# Patient Record
Sex: FEMALE | Race: BLACK OR AFRICAN AMERICAN | URBAN - METROPOLITAN AREA
[De-identification: names, ages, dates, MRNs, and addresses within clinical notes are randomized per-mention and may not be internally consistent; named-entity substitution may affect disease eponyms.]

---

## 2017-05-05 ENCOUNTER — APPOINTMENT (OUTPATIENT)
Dept: CT IMAGING | Facility: CLINIC | Age: 32
End: 2017-05-05
Attending: EMERGENCY MEDICINE
Payer: COMMERCIAL

## 2017-05-05 ENCOUNTER — HOSPITAL ENCOUNTER (EMERGENCY)
Facility: CLINIC | Age: 32
Discharge: HOME | End: 2017-05-05
Attending: EMERGENCY MEDICINE | Admitting: EMERGENCY MEDICINE
Payer: COMMERCIAL

## 2017-05-05 VITALS
WEIGHT: 210 LBS | RESPIRATION RATE: 20 BRPM | BODY MASS INDEX: 38.41 KG/M2 | SYSTOLIC BLOOD PRESSURE: 108 MMHG | OXYGEN SATURATION: 98 % | DIASTOLIC BLOOD PRESSURE: 70 MMHG | TEMPERATURE: 98.4 F

## 2017-05-05 DIAGNOSIS — R10.13 ABDOMINAL PAIN, EPIGASTRIC: ICD-10-CM

## 2017-05-05 LAB
ALBUMIN SERPL-MCNC: 3.4 G/DL (ref 3.4–5)
ALBUMIN UR-MCNC: 30 MG/DL
ALP SERPL-CCNC: 81 U/L (ref 40–150)
ALT SERPL W P-5'-P-CCNC: 14 U/L (ref 0–50)
ANION GAP SERPL CALCULATED.3IONS-SCNC: 6 MMOL/L (ref 3–14)
APPEARANCE UR: CLEAR
AST SERPL W P-5'-P-CCNC: 16 U/L (ref 0–45)
BASOPHILS # BLD AUTO: 0 10E9/L (ref 0–0.2)
BASOPHILS NFR BLD AUTO: 0.5 %
BILIRUB SERPL-MCNC: 0.6 MG/DL (ref 0.2–1.3)
BILIRUB UR QL STRIP: NEGATIVE
BUN SERPL-MCNC: 7 MG/DL (ref 7–30)
CALCIUM SERPL-MCNC: 8.8 MG/DL (ref 8.5–10.1)
CHLORIDE SERPL-SCNC: 112 MMOL/L (ref 94–109)
CO2 SERPL-SCNC: 23 MMOL/L (ref 20–32)
COLOR UR AUTO: YELLOW
CREAT SERPL-MCNC: 0.59 MG/DL (ref 0.52–1.04)
DIFFERENTIAL METHOD BLD: NORMAL
EOSINOPHIL # BLD AUTO: 0 10E9/L (ref 0–0.7)
EOSINOPHIL NFR BLD AUTO: 0.4 %
ERYTHROCYTE [DISTWIDTH] IN BLOOD BY AUTOMATED COUNT: 13.7 % (ref 10–15)
GFR SERPL CREATININE-BSD FRML MDRD: ABNORMAL ML/MIN/1.7M2
GLUCOSE SERPL-MCNC: 87 MG/DL (ref 70–99)
GLUCOSE UR STRIP-MCNC: NEGATIVE MG/DL
HCG UR QL: NEGATIVE
HCT VFR BLD AUTO: 36.6 % (ref 35–47)
HGB BLD-MCNC: 12.6 G/DL (ref 11.7–15.7)
HGB UR QL STRIP: NEGATIVE
IMM GRANULOCYTES # BLD: 0 10E9/L (ref 0–0.4)
IMM GRANULOCYTES NFR BLD: 0.5 %
KETONES UR STRIP-MCNC: 40 MG/DL
LEUKOCYTE ESTERASE UR QL STRIP: NEGATIVE
LIPASE SERPL-CCNC: 144 U/L (ref 73–393)
LYMPHOCYTES # BLD AUTO: 1.9 10E9/L (ref 0.8–5.3)
LYMPHOCYTES NFR BLD AUTO: 33.3 %
MCH RBC QN AUTO: 31.1 PG (ref 26.5–33)
MCHC RBC AUTO-ENTMCNC: 34.4 G/DL (ref 31.5–36.5)
MCV RBC AUTO: 90 FL (ref 78–100)
MONOCYTES # BLD AUTO: 0.4 10E9/L (ref 0–1.3)
MONOCYTES NFR BLD AUTO: 6.2 %
MUCOUS THREADS #/AREA URNS LPF: PRESENT /LPF
NEUTROPHILS # BLD AUTO: 3.3 10E9/L (ref 1.6–8.3)
NEUTROPHILS NFR BLD AUTO: 59.1 %
NITRATE UR QL: NEGATIVE
NRBC # BLD AUTO: 0 10*3/UL
NRBC BLD AUTO-RTO: 0 /100
PH UR STRIP: 8 PH (ref 5–7)
PLATELET # BLD AUTO: 221 10E9/L (ref 150–450)
POTASSIUM SERPL-SCNC: 4 MMOL/L (ref 3.4–5.3)
PROT SERPL-MCNC: 7.2 G/DL (ref 6.8–8.8)
RBC # BLD AUTO: 4.05 10E12/L (ref 3.8–5.2)
RBC #/AREA URNS AUTO: 3 /HPF (ref 0–2)
SODIUM SERPL-SCNC: 141 MMOL/L (ref 133–144)
SP GR UR STRIP: 1.03 (ref 1–1.03)
SQUAMOUS #/AREA URNS AUTO: 2 /HPF (ref 0–1)
TRI-PHOS CRY #/AREA URNS HPF: ABNORMAL /HPF
URN SPEC COLLECT METH UR: ABNORMAL
UROBILINOGEN UR STRIP-MCNC: 4 MG/DL (ref 0–2)
WBC # BLD AUTO: 5.7 10E9/L (ref 4–11)
WBC #/AREA URNS AUTO: 1 /HPF (ref 0–2)

## 2017-05-05 PROCEDURE — 25000125 ZZHC RX 250

## 2017-05-05 PROCEDURE — 25500064 ZZH RX 255 OP 636: Performed by: EMERGENCY MEDICINE

## 2017-05-05 PROCEDURE — 25000128 H RX IP 250 OP 636: Performed by: PHYSICIAN ASSISTANT

## 2017-05-05 PROCEDURE — 96374 THER/PROPH/DIAG INJ IV PUSH: CPT | Mod: 59

## 2017-05-05 PROCEDURE — 85025 COMPLETE CBC W/AUTO DIFF WBC: CPT | Performed by: PHYSICIAN ASSISTANT

## 2017-05-05 PROCEDURE — 96361 HYDRATE IV INFUSION ADD-ON: CPT

## 2017-05-05 PROCEDURE — 40000257 ZZH STATISTIC CONSULT NO CHARGE VASC ACCESS

## 2017-05-05 PROCEDURE — 80053 COMPREHEN METABOLIC PANEL: CPT | Performed by: PHYSICIAN ASSISTANT

## 2017-05-05 PROCEDURE — 25000125 ZZHC RX 250: Performed by: EMERGENCY MEDICINE

## 2017-05-05 PROCEDURE — 81001 URINALYSIS AUTO W/SCOPE: CPT | Performed by: PHYSICIAN ASSISTANT

## 2017-05-05 PROCEDURE — 74177 CT ABD & PELVIS W/CONTRAST: CPT

## 2017-05-05 PROCEDURE — 99285 EMERGENCY DEPT VISIT HI MDM: CPT | Mod: 25

## 2017-05-05 PROCEDURE — 81025 URINE PREGNANCY TEST: CPT | Performed by: PHYSICIAN ASSISTANT

## 2017-05-05 PROCEDURE — 83690 ASSAY OF LIPASE: CPT | Performed by: PHYSICIAN ASSISTANT

## 2017-05-05 PROCEDURE — 40000556 ZZH STATISTIC PERIPHERAL IV START W US GUIDANCE

## 2017-05-05 RX ORDER — IOPAMIDOL 755 MG/ML
105 INJECTION, SOLUTION INTRAVASCULAR ONCE
Status: COMPLETED | OUTPATIENT
Start: 2017-05-05 | End: 2017-05-05

## 2017-05-05 RX ORDER — ONDANSETRON 4 MG/1
TABLET, ORALLY DISINTEGRATING ORAL
Status: COMPLETED
Start: 2017-05-05 | End: 2017-05-05

## 2017-05-05 RX ORDER — ONDANSETRON 4 MG/1
TABLET, ORALLY DISINTEGRATING ORAL
Status: DISCONTINUED
Start: 2017-05-05 | End: 2017-05-05 | Stop reason: HOSPADM

## 2017-05-05 RX ORDER — HYDROMORPHONE HYDROCHLORIDE 1 MG/ML
0.5 INJECTION, SOLUTION INTRAMUSCULAR; INTRAVENOUS; SUBCUTANEOUS ONCE
Status: DISCONTINUED | OUTPATIENT
Start: 2017-05-05 | End: 2017-05-05 | Stop reason: CLARIF

## 2017-05-05 RX ORDER — ONDANSETRON 4 MG/1
4 TABLET, ORALLY DISINTEGRATING ORAL ONCE
Status: DISCONTINUED | OUTPATIENT
Start: 2017-05-05 | End: 2017-05-05

## 2017-05-05 RX ORDER — ONDANSETRON 2 MG/ML
4 INJECTION INTRAMUSCULAR; INTRAVENOUS EVERY 30 MIN PRN
Status: DISCONTINUED | OUTPATIENT
Start: 2017-05-05 | End: 2017-05-05 | Stop reason: HOSPADM

## 2017-05-05 RX ORDER — SUCRALFATE ORAL 1 G/10ML
1 SUSPENSION ORAL 4 TIMES DAILY
Qty: 420 ML | Refills: 1 | Status: SHIPPED | OUTPATIENT
Start: 2017-05-05 | End: 2018-04-02

## 2017-05-05 RX ORDER — HYDROMORPHONE HYDROCHLORIDE 1 MG/ML
0.5 INJECTION, SOLUTION INTRAMUSCULAR; INTRAVENOUS; SUBCUTANEOUS
Status: COMPLETED | OUTPATIENT
Start: 2017-05-05 | End: 2017-05-05

## 2017-05-05 RX ORDER — ONDANSETRON 4 MG/1
4 TABLET, FILM COATED ORAL EVERY 8 HOURS PRN
Qty: 18 TABLET | Refills: 0 | Status: SHIPPED | OUTPATIENT
Start: 2017-05-05

## 2017-05-05 RX ADMIN — ONDANSETRON 4 MG: 4 TABLET, ORALLY DISINTEGRATING ORAL at 13:49

## 2017-05-05 RX ADMIN — SODIUM CHLORIDE 1000 ML: 9 INJECTION, SOLUTION INTRAVENOUS at 15:09

## 2017-05-05 RX ADMIN — IOPAMIDOL 105 ML: 755 INJECTION, SOLUTION INTRAVENOUS at 17:36

## 2017-05-05 RX ADMIN — HYDROMORPHONE HYDROCHLORIDE 0.5 MG: 1 INJECTION, SOLUTION INTRAMUSCULAR; INTRAVENOUS; SUBCUTANEOUS at 15:07

## 2017-05-05 RX ADMIN — SODIUM CHLORIDE 72 ML: 9 INJECTION, SOLUTION INTRAVENOUS at 17:42

## 2017-05-05 ASSESSMENT — ENCOUNTER SYMPTOMS
PALPITATIONS: 0
NAUSEA: 1
CHEST TIGHTNESS: 0
ABDOMINAL DISTENTION: 0
SHORTNESS OF BREATH: 0
VOMITING: 1
CHILLS: 0
DIARRHEA: 1
HEMATURIA: 0
FLANK PAIN: 0
FEVER: 0
ABDOMINAL PAIN: 1
DIFFICULTY URINATING: 0

## 2017-05-05 NOTE — ED PROVIDER NOTES
History     Chief Complaint:    Abdominal Pain (with vomiting for past few days )      HPI   Alexandru Trujillo is a 31 year old female who presents with intermittent sharp abdominal pain and vomiting for several weeks. The pain is in the epigastrium and occurs right after eating greasy foods. It is better when she is laying down. It has been getting increasingly more severe with each episode. She has been taking Zofran and home for nausea and it seems to help some. She has also been taking kayexalate for constipation. She has been having BMs every day, but they have been runny. Her last normal bowel movement was several weeks ago. She does use Ibuprofen a lot. She has had extensive abdominal surgeries including cholecystectomy, hysterectomy, and  x3. She also reports darker than usual stools, but denies gross blood. She denies fever, chest pain, shortness or breath.     Allergies:    No Known Allergies     Medications:      ferrous sulfate (SLO-FE) 142 (45 FE) MG TBCR   oxyCODONE-acetaminophen (PERCOCET)  MG per tablet   senna-docusate (SENOKOT-S;PERICOLACE) 8.6-50 MG per tablet   ondansetron (ZOFRAN ODT) 4 MG disintegrating tablet       Problem List:    Patient Active Problem List    Diagnosis Date Noted     Postoperative pain 10/26/2016     Priority: Medium     Pain 10/25/2016     Priority: Medium     Dysmenorrhea 08/15/2016     Priority: Medium     Excessive or frequent menstruation 08/15/2016     Priority: Medium     Symptomatic cholelithiasis 2016     Priority: Medium        Past Medical History:      Past Medical History:   Diagnosis Date     NO ACTIVE PROBLEMS        Past Surgical History:      Past Surgical History:   Procedure Laterality Date     CYSTOSCOPY N/A 10/26/2016    Procedure: CYSTOSCOPY;  Surgeon: Mahendra Steen MD;  Location:  OR     LAPAROSCOPIC CHOLECYSTECTOMY N/A 2016    Procedure: LAPAROSCOPIC CHOLECYSTECTOMY;  Surgeon: Boaz Pinto MD;  Location:  [facility] OR     LAPAROSCOPIC HYSTERECTOMY SUPRACERVICAL N/A 10/26/2016    Procedure: LAPAROSCOPIC HYSTERECTOMY SUPRACERVICAL;  Surgeon: Mahendra Steen MD;  Location: [facility] OR     LAPAROSCOPIC SALPINGECTOMY Bilateral 10/26/2016    Procedure: LAPAROSCOPIC SALPINGECTOMY;  Surgeon: Mahendra Steen MD;  Location: [facility] OR     TUBAL LIGATION  2009       Family History:    Family History   Problem Relation Age of Onset     DIABETES No family hx of      Myocardial Infarction No family hx of      CEREBROVASCULAR DISEASE No family hx of      Breast Cancer Maternal Grandmother 50     CANCER No family hx of      no colon or ovarian CA     Asthma Brother      Asthma Son        Social History:  Marital Status:   [2]  Social History   Substance Use Topics     Smoking status: Never Smoker     Smokeless tobacco: Never Used     Alcohol use No        Review of Systems   Constitutional: Negative for chills and fever.   Respiratory: Negative for chest tightness and shortness of breath.    Cardiovascular: Negative for chest pain and palpitations.   Gastrointestinal: Positive for abdominal pain, diarrhea, nausea and vomiting. Negative for abdominal distention.   Genitourinary: Negative for difficulty urinating, flank pain, hematuria and urgency.   Skin: Negative for rash.       Physical Exam   First Vitals:  BP: 144/80  Heart Rate: 77  Temp: 98.4  F (36.9  C)  Resp: 18  Weight: 95.3 kg (210 lb)  SpO2: 93 %      Physical Exam  General: Distressed. Intermittently dry heaving and vomiting. Tearful.  Alert and oriented.   Head:  The scalp, face, and head appear normal   Eyes:  The pupils are equal, round, and reactive to light     Extraocular muscles are intact    Conjunctivae and sclerae are normal    CV:  Regular rate and rhythm     Normal S1/S2    No pathological murmur detected   Resp:  Lungs are clear to auscultation    Non-labored    No rales or wheezing   GI:  Abdomen is soft, non-distended    Tenderness in the epigastrium and  RUQ without rebound.     Hypoactive BS   MS:  Normal muscular tone   Skin:  No rash or acute skin lesions noted. Diaphoretic.   Neuro: Speech is normal and fluent.       Emergency Department Course   Imaging:  CT Abdomen Pelvis w Contrast   Preliminary Result   IMPRESSION:    1. A trace amount of nonspecific free fluid in the pelvis.   2. No other cause of acute pain identified in the abdomen or pelvis.   The appendix is unremarkable.        Laboratory:  Labs Ordered and Resulted from Time of ED Arrival Up to the Time of Departure from the ED   COMPREHENSIVE METABOLIC PANEL - Abnormal; Notable for the following:        Result Value    Chloride 112 (*)     All other components within normal limits   ROUTINE UA WITH MICROSCOPIC - Abnormal; Notable for the following:     Ketones Urine 40 (*)     pH Urine 8.0 (*)     Protein Albumin Urine 30 (*)     Urobilinogen mg/dL 4.0 (*)     RBC Urine 3 (*)     Squamous Epithelial /HPF Urine 2 (*)     Mucous Urine Present (*)     Triple Phosphates Moderate (*)     All other components within normal limits   CBC WITH PLATELETS DIFFERENTIAL   LIPASE   HCG QUALITATIVE URINE           Interventions:  Medications   ondansetron (ZOFRAN) injection 4 mg (not administered)   0.9% sodium chloride BOLUS (1,000 mLs Intravenous New Bag 5/5/17 1509)   HYDROmorphone (PF) (DILAUDID) injection 0.5 mg (0.5 mg Intravenous Given 5/5/17 1507)   ondansetron (ZOFRAN-ODT) 4 MG ODT tab (4 mg  Given 5/5/17 1349)   iopamidol (ISOVUE-370) solution 105 mL (105 mLs Intravenous Given 5/5/17 1736)   sodium chloride 0.9 % for CT scan flush dose 72 mL (72 mLs Intravenous Given 5/5/17 1742)     ED Course:  I reviewed the patient's medical record.   The patient was seen and examined by myself. I discussed the course of care with the patient including laboratory and diagnostic studies.    She understands and is agreeable to the plan.   Recheck. 6:15pm   I discussed with the patient the results of the above studies and  procedures.   She will be discharged to home with outpatient EGD and sent with prescriptions for a PPI and carafate.    All questions were answered prior to discharge, and the patient was told to follow up per discharge instructions.    Reasons for return as well as follow up were reviewed with the patient. She understands and agrees to this plan.      Impression & Plan    Medical Decision Making:  Alexandru Trujillo is a 31 year old female who presents with abdominal pain and vomiting for several weeks. There was a concern for bowel obstruction given given her symptoms and extensive abdominal surgeries, but CT was unremarkable. Lipase normal and hemoglobin stable.  All other lab work was unremarkable. The patient likely has peptic ulcer disease and was given prescriptions for omeprazole, Carafate, and Zofran and was set up for an outpatient EGD with [facility]. Avoiding Ibuprofen was also stressed.     Diagnosis:    ICD-10-CM    1. Abdominal pain, epigastric R10.13 UPPER GI ENDOSCOPY     GASTROENTEROLOGY ADULT REF PROCEDURE ONLY       Disposition:  discharged to home    Discharge Medications:  New Prescriptions    No medications on file         Vilma Sharma  5/5/2017    EMERGENCY DEPARTMENT       Vilma Sharma PA-C  05/05/17 0922

## 2017-05-05 NOTE — LETTER
EMERGENCY DEPARTMENT  [address] [facility] [address]  [phone]    May 5, 2017    Alexandru Trujillo  [address]  [facility] [address]  [phone] (home)     : 1985      To Whom it may concern:    Alexandru Trujillo was seen in our Emergency Department today, May 5, 2017.  I expect her condition to improve over the next 2 days.  She may return to work/school when improved.    Sincerely,    Vilma Sharma PA-C

## 2017-05-05 NOTE — ED AVS SNAPSHOT
Emergency Department    [address] [place] [address]    Phone:  [phone]    Fax:  [phone]                                       Alexnadru Trujillo   MRN: [id number]    Department:   Emergency Department   Date of Visit:  5/5/2017           After Visit Summary Signature Page     I have received my discharge instructions, and my questions have been answered. I have discussed any challenges I see with this plan with the nurse or doctor.    ..........................................................................................................................................  Patient/Patient Representative Signature      ..........................................................................................................................................  Patient Representative Print Name and Relationship to Patient    ..................................................               ................................................  Date                                            Time    ..........................................................................................................................................  Reviewed by Signature/Title    ...................................................              ..............................................  Date                                                            Time

## 2017-05-05 NOTE — ED NOTES
"Attempted IV start and blood draw on right upper arm- pt moved arm during IV placement and yelled out \"ouch\" \"my arm is numb\". IV placement and blood draw unsuccessful. Pt offered ODT Zofran at this time.   "

## 2017-05-05 NOTE — ED AVS SNAPSHOT
Emergency Department    [address] [facility] [address]    Phone:  [phone]    Fax:  [phone]                                       Alexandru Trujillo   MRN: [id number]    Department:   Emergency Department   Date of Visit:  5/5/2017           Patient Information     Date Of Birth          1985        Your diagnoses for this visit were:     Abdominal pain, epigastric        You were seen by eJssica Paiz MD.      Follow-up Information     Follow up with Clinic, [facility].    Contact information:    [address] [address]  [facility] [address] [phone]          Discharge Instructions       Please avoid Ibuprofen and other NSAID. Tylenol is a better choice for pain. The EDG referral service will call you in the next couple of days to set up an appointment. Return to the ED if blood in the stool or worsening symptoms.     Future Appointments        Provider Department Dept Phone Center    5/11/2017 8:30 AM Eveline Olivares MD [facility] [phone]       24 Hour Appointment Hotline       To make an appointment at any [facility], call [phone] ([phone]). If you don't have a family doctor or clinic, we will help you find one. [facility] are conveniently located to serve the needs of you and your family.          ED Discharge Orders     GASTROENTEROLOGY ADULT REF PROCEDURE ONLY       Last Lab Result: Creatinine (mg/dL)       Date                     Value                 05/05/2017               0.59             ----------  Body mass index is 38.41 kg/(m^2).     Needed:  No  Language:  English    Patient will be contacted to schedule procedure.     Please be aware that coverage of these services is subject to the terms and limitations of your health insurance plan.  Call member services at your health plan with any benefit or coverage questions.  Any procedures must be performed at a [facility] facility OR  coordinated by your clinic's referral office.    Please bring the following with you to your appointment:    (1) Any X-Rays, CTs or MRIs which have been performed.  Contact the facility where they were done to arrange for  prior to your scheduled appointment.    (2) List of current medications   (3) This referral request   (4) Any documents/labs given to you for this referral            UPPER GI ENDOSCOPY                    Review of your medicines      START taking        Dose / Directions Last dose taken    omeprazole 20 MG CR capsule   Commonly known as:  priLOSEC   Dose:  40 mg   Quantity:  120 capsule        Take 2 capsules (40 mg) by mouth 2 times daily   Refills:  0        ondansetron 4 MG tablet   Commonly known as:  ZOFRAN   Dose:  4 mg   Quantity:  18 tablet        Take 1 tablet (4 mg) by mouth every 8 hours as needed for nausea   Refills:  0        sucralfate 1 GM/10ML suspension   Commonly known as:  CARAFATE   Dose:  1 g   Quantity:  420 mL        Take 10 mLs (1 g) by mouth 4 times daily   Refills:  1          Our records show that you are taking the medicines listed below. If these are incorrect, please call your family doctor or clinic.        Dose / Directions Last dose taken    ferrous sulfate 142 (45 FE) MG Tbcr   Commonly known as:  SLO-FE   Dose:  142 mg   Quantity:  90 tablet        Take 1 tablet (142 mg) by mouth daily   Refills:  3        ondansetron 4 MG ODT tab   Commonly known as:  ZOFRAN ODT   Dose:  4-8 mg   Quantity:  30 tablet        Take 1-2 tablets (4-8 mg) by mouth every 8 hours as needed for nausea   Refills:  1        oxyCODONE-acetaminophen  MG per tablet   Commonly known as:  PERCOCET   Dose:  1-2 tablet   Quantity:  30 tablet        Take 1-2 tablets by mouth every 4 hours as needed for pain (moderate to severe)   Refills:  0        senna-docusate 8.6-50 MG per tablet   Commonly known as:  SENOKOT-S;PERICOLACE   Dose:  2 tablet   Quantity:  60 tablet        Take 2  tablets by mouth daily   Refills:  1                Prescriptions were sent or printed at these locations (3 Prescriptions)                   Other Prescriptions                Printed at Department/Unit printer (3 of 3)         omeprazole (PRILOSEC) 20 MG CR capsule               sucralfate (CARAFATE) 1 GM/10ML suspension               ondansetron (ZOFRAN) 4 MG tablet                Procedures and tests performed during your visit     CBC with platelets differential    CT Abdomen Pelvis w Contrast    Comprehensive metabolic panel    HCG qualitative urine    Lipase    UA with Microscopic      Orders Needing Specimen Collection     None      Pending Results     Date and Time Order Name Status Description    5/5/2017 1552 CT Abdomen Pelvis w Contrast Preliminary             Pending Culture Results     No orders found from 5/3/2017 to 5/6/2017.            Pending Results Instructions     If you had any lab results that were not finalized at the time of your Discharge, you can call the ED Lab Result RN at [phone]. You will be contacted by this team for any positive Lab results or changes in treatment. The nurses are available 7 days a week from 10A to 6:30P.  You can leave a message 24 hours per day and they will return your call.        Test Results From Your Hospital Stay        5/5/2017  3:22 PM      Component Results     Component Value Ref Range & Units Status    WBC 5.7 4.0 - 11.0 10e9/L Final    RBC Count 4.05 3.8 - 5.2 10e12/L Final    Hemoglobin 12.6 11.7 - 15.7 g/dL Final    Hematocrit 36.6 35.0 - 47.0 % Final    MCV 90 78 - 100 fl Final    MCH 31.1 26.5 - 33.0 pg Final    MCHC 34.4 31.5 - 36.5 g/dL Final    RDW 13.7 10.0 - 15.0 % Final    Platelet Count 221 150 - 450 10e9/L Final    Diff Method Automated Method  Final    % Neutrophils 59.1 % Final    % Lymphocytes 33.3 % Final    % Monocytes 6.2 % Final    % Eosinophils 0.4 % Final    % Basophils 0.5 % Final    % Immature Granulocytes 0.5 % Final     Nucleated RBCs 0 0 /100 Final    Absolute Neutrophil 3.3 1.6 - 8.3 10e9/L Final    Absolute Lymphocytes 1.9 0.8 - 5.3 10e9/L Final    Absolute Monocytes 0.4 0.0 - 1.3 10e9/L Final    Absolute Eosinophils 0.0 0.0 - 0.7 10e9/L Final    Absolute Basophils 0.0 0.0 - 0.2 10e9/L Final    Abs Immature Granulocytes 0.0 0 - 0.4 10e9/L Final    Absolute Nucleated RBC 0.0  Final         5/5/2017  3:45 PM      Component Results     Component Value Ref Range & Units Status    Sodium 141 133 - 144 mmol/L Final    Potassium 4.0 3.4 - 5.3 mmol/L Final    Chloride 112 (H) 94 - 109 mmol/L Final    Carbon Dioxide 23 20 - 32 mmol/L Final    Anion Gap 6 3 - 14 mmol/L Final    Glucose 87 70 - 99 mg/dL Final    Urea Nitrogen 7 7 - 30 mg/dL Final    Creatinine 0.59 0.52 - 1.04 mg/dL Final    GFR Estimate >90  Non  GFR Calc   >60 mL/min/1.7m2 Final    GFR Estimate If Black >90   GFR Calc   >60 mL/min/1.7m2 Final    Calcium 8.8 8.5 - 10.1 mg/dL Final    Bilirubin Total 0.6 0.2 - 1.3 mg/dL Final    Albumin 3.4 3.4 - 5.0 g/dL Final    Protein Total 7.2 6.8 - 8.8 g/dL Final    Alkaline Phosphatase 81 40 - 150 U/L Final    ALT 14 0 - 50 U/L Final    AST 16 0 - 45 U/L Final         5/5/2017  3:44 PM      Component Results     Component Value Ref Range & Units Status    Lipase 144 73 - 393 U/L Final         5/5/2017  4:25 PM      Component Results     Component Value Ref Range & Units Status    HCG Qual Urine Negative NEG Final         5/5/2017  4:39 PM      Component Results     Component Value Ref Range & Units Status    Color Urine Yellow  Final    Appearance Urine Clear  Final    Glucose Urine Negative NEG mg/dL Final    Bilirubin Urine Negative NEG Final    Ketones Urine 40 (A) NEG mg/dL Final    Specific Gravity Urine 1.026 1.003 - 1.035 Final    Blood Urine Negative NEG Final    pH Urine 8.0 (H) 5.0 - 7.0 pH Final    Protein Albumin Urine 30 (A) NEG mg/dL Final    Urobilinogen mg/dL 4.0 (H) 0.0 - 2.0 mg/dL  Final    Nitrite Urine Negative NEG Final    Leukocyte Esterase Urine Negative NEG Final    Source Midstream Urine  Final    WBC Urine 1 0 - 2 /HPF Final    RBC Urine 3 (H) 0 - 2 /HPF Final    Squamous Epithelial /HPF Urine 2 (H) 0 - 1 /HPF Final    Mucous Urine Present (A) NEG /LPF Final    Triple Phosphates Moderate (A) NEG /HPF Final         5/5/2017  6:06 PM      Narrative     CT ABDOMEN AND PELVIS WITH CONTRAST   5/5/2017 5:44 PM     HISTORY: Abdominal pain and vomiting.    COMPARISON: 10/24/2016.    TECHNIQUE: Following the uneventful administration of 105 mL  Isovue-370 intravenous contrast, helical sections were acquired from  the top of the diaphragm through the pubic symphysis. Coronal  reconstructions were generated. Radiation dose for this scan was  reduced using automated exposure control, adjustment of the mA and/or  kV according to the patient's size, or iterative reconstruction  technique.    FINDINGS:     Abdomen: The liver, spleen, pancreas, adrenal glands and kidneys are  unremarkable. Prior cholecystectomy. No enlarged lymph nodes or free  fluid in the upper abdomen.    Scan through the lower chest is unremarkable.    Pelvis: The small and large bowel are normal in caliber. The appendix  is unremarkable. No bowel wall thickening, pneumatosis or free  intraperitoneal gas. No enlarged lymph nodes in the pelvis. A trace  amount of free fluid in the pelvis.        Impression     IMPRESSION:   1. A trace amount of nonspecific free fluid in the pelvis.  2. No other cause of acute pain identified in the abdomen or pelvis.  The appendix is unremarkable.                Clinical Quality Measure: Blood Pressure Screening     Your blood pressure was checked while you were in the emergency department today. The last reading we obtained was  BP: 122/78 . Please read the guidelines below about what these numbers mean and what you should do about them.  If your systolic blood pressure (the top number) is less  "than 120 and your diastolic blood pressure (the bottom number) is less than 80, then your blood pressure is normal. There is nothing more that you need to do about it.  If your systolic blood pressure (the top number) is 120-139 or your diastolic blood pressure (the bottom number) is 80-89, your blood pressure may be higher than it should be. You should have your blood pressure rechecked within a year by a primary care provider.  If your systolic blood pressure (the top number) is 140 or greater or your diastolic blood pressure (the bottom number) is 90 or greater, you may have high blood pressure. High blood pressure is treatable, but if left untreated over time it can put you at risk for heart attack, stroke, or kidney failure. You should have your blood pressure rechecked by a primary care provider within the next 4 weeks.  If your provider in the emergency department today gave you specific instructions to follow-up with your doctor or provider even sooner than that, you should follow that instruction and not wait for up to 4 weeks for your follow-up visit.        Thank you for choosing [facility]       Thank you for choosing [facility] for your care. Our goal is always to provide you with excellent care. Hearing back from our patients is one way we can continue to improve our services. Please take a few minutes to complete the written survey that you may receive in the mail after you visit with us. Thank you!        [employer] Information     [employer] lets you send messages to your doctor, view your test results, renew your prescriptions, schedule appointments and more. To sign up, go to [email or web address] . Click on \"Log in\" on the left side of the screen, which will take you to the Welcome page. Then click on \"Sign up Now\" on the right side of the page.     You will be asked to enter the access code listed below, as well as some personal information. Please follow the directions to create your username and " password.     Your access code is: [email or web address]  Expires: 8/3/2017  6:35 PM     Your access code will  in 90 days. If you need help or a new code, please call your [facility] or [phone].        Care EveryWhere ID     This is your Care EveryWhere ID. This could be used by other organizations to access your [facility] medical records  [id number]        After Visit Summary       This is your record. Keep this with you and show to your community pharmacist(s) and doctor(s) at your next visit.

## 2017-07-15 ENCOUNTER — HEALTH MAINTENANCE LETTER (OUTPATIENT)
Age: 32
End: 2017-07-15

## 2017-08-07 ENCOUNTER — HOSPITAL ENCOUNTER (EMERGENCY)
Facility: CLINIC | Age: 32
Discharge: HOME | End: 2017-08-07
Attending: EMERGENCY MEDICINE | Admitting: EMERGENCY MEDICINE
Payer: COMMERCIAL

## 2017-08-07 VITALS
DIASTOLIC BLOOD PRESSURE: 79 MMHG | BODY MASS INDEX: 38.41 KG/M2 | SYSTOLIC BLOOD PRESSURE: 116 MMHG | RESPIRATION RATE: 20 BRPM | TEMPERATURE: 98.3 F | OXYGEN SATURATION: 99 % | WEIGHT: 210 LBS

## 2017-08-07 DIAGNOSIS — M54.50 ACUTE LOW BACK PAIN WITHOUT SCIATICA, UNSPECIFIED BACK PAIN LATERALITY: ICD-10-CM

## 2017-08-07 PROCEDURE — 99284 EMERGENCY DEPT VISIT MOD MDM: CPT | Mod: 25

## 2017-08-07 PROCEDURE — 96372 THER/PROPH/DIAG INJ SC/IM: CPT

## 2017-08-07 PROCEDURE — 25000128 H RX IP 250 OP 636: Performed by: EMERGENCY MEDICINE

## 2017-08-07 PROCEDURE — 25000132 ZZH RX MED GY IP 250 OP 250 PS 637: Performed by: EMERGENCY MEDICINE

## 2017-08-07 RX ORDER — TRAMADOL HYDROCHLORIDE 50 MG/1
50-100 TABLET ORAL EVERY 6 HOURS PRN
Qty: 20 TABLET | Refills: 0 | Status: SHIPPED | OUTPATIENT
Start: 2017-08-07 | End: 2018-04-02

## 2017-08-07 RX ORDER — TRAMADOL HYDROCHLORIDE 50 MG/1
100 TABLET ORAL ONCE
Status: COMPLETED | OUTPATIENT
Start: 2017-08-07 | End: 2017-08-07

## 2017-08-07 RX ORDER — CYCLOBENZAPRINE HCL 10 MG
10 TABLET ORAL ONCE
Status: COMPLETED | OUTPATIENT
Start: 2017-08-07 | End: 2017-08-07

## 2017-08-07 RX ORDER — LIDOCAINE 50 MG/G
1 PATCH TOPICAL EVERY 24 HOURS
Qty: 10 PATCH | Refills: 0 | Status: SHIPPED | OUTPATIENT
Start: 2017-08-07 | End: 2017-08-17

## 2017-08-07 RX ORDER — LIDOCAINE 50 MG/G
1 PATCH TOPICAL ONCE
Status: COMPLETED | OUTPATIENT
Start: 2017-08-07 | End: 2017-08-07

## 2017-08-07 RX ORDER — KETOROLAC TROMETHAMINE 30 MG/ML
60 INJECTION, SOLUTION INTRAMUSCULAR; INTRAVENOUS ONCE
Status: COMPLETED | OUTPATIENT
Start: 2017-08-07 | End: 2017-08-07

## 2017-08-07 RX ORDER — CYCLOBENZAPRINE HCL 10 MG
10 TABLET ORAL 3 TIMES DAILY PRN
Qty: 30 TABLET | Refills: 0 | Status: SHIPPED | OUTPATIENT
Start: 2017-08-07 | End: 2018-04-30

## 2017-08-07 RX ADMIN — CYCLOBENZAPRINE HYDROCHLORIDE 10 MG: 10 TABLET, FILM COATED ORAL at 07:18

## 2017-08-07 RX ADMIN — LIDOCAINE 1 PATCH: 50 PATCH CUTANEOUS at 07:22

## 2017-08-07 RX ADMIN — TRAMADOL HYDROCHLORIDE 100 MG: 50 TABLET, COATED ORAL at 07:18

## 2017-08-07 RX ADMIN — KETOROLAC TROMETHAMINE 60 MG: 30 INJECTION, SOLUTION INTRAMUSCULAR at 07:20

## 2017-08-07 ASSESSMENT — ENCOUNTER SYMPTOMS
NUMBNESS: 0
BACK PAIN: 1
ABDOMINAL PAIN: 0
FEVER: 0
CHILLS: 0

## 2017-08-07 NOTE — ED AVS SNAPSHOT
[facility] Emergency Department    [address]    [facility] [address]    Phone:  [phone]    Fax:  [phone]                                       Alexandru Trujillo   MRN: [id number]    Department:  [facility] Emergency Department   Date of Visit:  8/7/2017           Patient Information     Date Of Birth          1985        Your diagnoses for this visit were:     Acute low back pain without sciatica, unspecified back pain laterality        You were seen by Candy Nieto MD.      Follow-up Information     Follow up with Eveline Olivares MD.    Specialty:  Internal Medicine    Why:  5-7 days as needed    Contact information:    [facility]  [address] [facility] [address]  [phone]          Follow up with [facility] Emergency [facility].    Specialty:  EMERGENCY MEDICINE    Why:  As needed, If symptoms worsen    Contact information:    [address]  [facility] [address] [phone]        Discharge Instructions       Discharge Instructions  Back Pain  You were seen today for back pain. Back pain can have many causes, but most will get better without surgery or other specific treatment. Sometimes there is a herniated ( slipped ) disc. We do not usually do MRI scans to look for these right away, since most herniated discs will get better on their own with time.  Today, we did not find any evidence that your back pain was caused by a serious condition. However, sometimes symptoms develop over time and cannot be found during an emergency visit, so it is very important that you follow up with your primary provider.  Generally, every Emergency Department visit should have a follow-up clinic visit with either a primary or a specialty clinic/provider. Please follow-up as instructed by your emergency provider today.    Return to the Emergency Department if:    You develop a fever with your back pain.      You have weakness or change in sensation in one or both legs.    You lose control of your bowels or bladder, or cannot empty your bladder (cannot pee).    Your pain gets much worse.     Follow-up with your provider:    Unless your pain has completely gone away, please make an appointment with your provider within one week. Most of the routine care for back pain is available in a clinic and not the Emergency Department. You may need further management of your back pain, such as more pain medication, imaging such as an X-ray or MRI, or physical therapy.    What can I do to help myself?    Remain Active -- People are often afraid that they will hurt their back further or delay recovery by remaining active, but this is one of the best things you can do for your back. In fact, staying in bed for a long time to rest is not recommended. Studies have shown that people with low back pain recover faster when they remain active. Movement helps to bring blood flow to the muscles and relieve muscle spasms as well as preventing loss of muscle strength.    Heat -- Using a heating pad can help with low back pain during the first few weeks. Do not sleep with a heating pad, as you can be burned.     Pain medications - You may take a pain medication such as Tylenol  (acetaminophen), Advil , Motrin  (ibuprofen) or Aleve  (naproxen).  If you were given a prescription for medicine here today, be sure to read all of the information (including the package insert) that comes with your prescription.  This will include important information about the medicine, its side effects, and any warnings that you need to know about.  The pharmacist who fills the prescription can provide more information and answer questions you may have about the medicine.  If you have questions or concerns that the pharmacist cannot address, please call or return to the Emergency Department.   Remember that you can always come back to the Emergency Department if  you are not able to see your regular provider in the amount of time listed above, if you get any new symptoms, or if there is anything that worries you.      24 Hour Appointment Hotline       To make an appointment at any [facility] clinic, call [phone] ([phone]). If you don't have a family doctor or clinic, we will help you find one. [facility] clinics are conveniently located to serve the needs of you and your family.             Review of your medicines      START taking        Dose / Directions Last dose taken    cyclobenzaprine 10 MG tablet   Commonly known as:  FLEXERIL   Dose:  10 mg   Quantity:  30 tablet        Take 1 tablet (10 mg) by mouth 3 times daily as needed for muscle spasms   Refills:  0        lidocaine 5 % Patch   Commonly known as:  LIDODERM   Dose:  1 patch   Quantity:  10 patch        Place 1 patch onto the skin every 24 hours for 10 days   Refills:  0        traMADol 50 MG tablet   Commonly known as:  ULTRAM   Dose:   mg   Quantity:  20 tablet        Take 1-2 tablets ( mg) by mouth every 6 hours as needed for pain   Refills:  0          Our records show that you are taking the medicines listed below. If these are incorrect, please call your family doctor or clinic.        Dose / Directions Last dose taken    ferrous sulfate 142 (45 FE) MG Tbcr   Commonly known as:  SLO-FE   Dose:  142 mg   Quantity:  90 tablet        Take 1 tablet (142 mg) by mouth daily   Refills:  3        ondansetron 4 MG tablet   Commonly known as:  ZOFRAN   Dose:  4 mg   Quantity:  18 tablet        Take 1 tablet (4 mg) by mouth every 8 hours as needed for nausea   Refills:  0        senna-docusate 8.6-50 MG per tablet   Commonly known as:  SENOKOT-S;PERICOLACE   Dose:  2 tablet   Quantity:  60 tablet        Take 2 tablets by mouth daily   Refills:  1        sucralfate 1 GM/10ML suspension   Commonly known as:  CARAFATE   Dose:  1 g   Quantity:  420 mL        Take 10 mLs (1 g) by mouth 4 times daily    Refills:  1                Prescriptions were sent or printed at these locations (3 Prescriptions)                   Other Prescriptions                Printed at Department/Unit printer (3 of 3)         traMADol (ULTRAM) 50 MG tablet               cyclobenzaprine (FLEXERIL) 10 MG tablet               lidocaine (LIDODERM) 5 % Patch                Orders Needing Specimen Collection     None      Pending Results     No orders found from 8/5/2017 to 8/8/2017.            Pending Culture Results     No orders found from 8/5/2017 to 8/8/2017.            Pending Results Instructions     If you had any lab results that were not finalized at the time of your Discharge, you can call the ED Lab Result RN at [phone]. You will be contacted by this team for any positive Lab results or changes in treatment. The nurses are available 7 days a week from 10A to 6:30P.  You can leave a message 24 hours per day and they will return your call.        Test Results From Your Hospital Stay               Clinical Quality Measure: Blood Pressure Screening     Your blood pressure was checked while you were in the emergency department today. The last reading we obtained was  BP: 131/88 . Please read the guidelines below about what these numbers mean and what you should do about them.  If your systolic blood pressure (the top number) is less than 120 and your diastolic blood pressure (the bottom number) is less than 80, then your blood pressure is normal. There is nothing more that you need to do about it.  If your systolic blood pressure (the top number) is 120-139 or your diastolic blood pressure (the bottom number) is 80-89, your blood pressure may be higher than it should be. You should have your blood pressure rechecked within a year by a primary care provider.  If your systolic blood pressure (the top number) is 140 or greater or your diastolic blood pressure (the bottom number) is 90 or greater, you may have high blood pressure.  "High blood pressure is treatable, but if left untreated over time it can put you at risk for heart attack, stroke, or kidney failure. You should have your blood pressure rechecked by a primary care provider within the next 4 weeks.  If your provider in the emergency department today gave you specific instructions to follow-up with your doctor or provider even sooner than that, you should follow that instruction and not wait for up to 4 weeks for your follow-up visit.        Thank you for choosing [facility]       Thank you for choosing [facility] for your care. Our goal is always to provide you with excellent care. Hearing back from our patients is one way we can continue to improve our services. Please take a few minutes to complete the written survey that you may receive in the mail after you visit with us. Thank you!        [facility] Information     [facility] lets you send messages to your doctor, view your test results, renew your prescriptions, schedule appointments and more. To sign up, go to [email or web address] . Click on \"Log in\" on the left side of the screen, which will take you to the Welcome page. Then click on \"Sign up Now\" on the right side of the page.     You will be asked to enter the access code listed below, as well as some personal information. Please follow the directions to create your username and password.     Your access code is: [email or web address]  Expires: 2017  7:54 AM     Your access code will  in 90 days. If you need help or a new code, please call your [facility] clinic or [phone].        Care EveryWhere ID     This is your Care EveryWhere ID. This could be used by other organizations to access your [facility] medical records  [id number]        Equal Access to Services     LEE [place] : Hadroyce Lowe, sampson reilly, antoinette pete. So [facility] [phone].    ATENCIÓN: Si habla español, tiene a sears disposición " servicios gratuitos de asistencia lingüística. Addy mcmillan [phone].    We comply with applicable federal civil rights laws and [place] laws. We do not discriminate on the basis of race, color, national origin, age, disability sex, sexual orientation or gender identity.            After Visit Summary       This is your record. Keep this with you and show to your community pharmacist(s) and doctor(s) at your next visit.

## 2017-08-07 NOTE — LETTER
[facility] EMERGENCY DEPARTMENT  [address]  [facility] [address]  Phone: [phone]  Fax: [phone]    August 7, 2017        Alexandru Trujillo  [address]   [facility] [address]          To whom it may concern:    RE: Alexandru Trujillo    Patient was seen and treated today at our emergency room and missed work. Please excuse her from work today. For the next week she should avoid lifting more than 10 pounds, and frequent twisting and bending movements.    Please contact me for questions or concerns.      Sincerely,        Candy Nieto MD

## 2017-08-07 NOTE — ED AVS SNAPSHOT
[facility] Emergency Department    [address]    [facility] [address]    Phone:  [phone]    Fax:  [phone]                                       Alexandru Trujillo   MRN: [id number]    Department:  [facility] Emergency Department   Date of Visit:  8/7/2017           After Visit Summary Signature Page     I have received my discharge instructions, and my questions have been answered. I have discussed any challenges I see with this plan with the nurse or doctor.    ..........................................................................................................................................  Patient/Patient Representative Signature      ..........................................................................................................................................  Patient Representative Print Name and Relationship to Patient    ..................................................               ................................................  Date                                            Time    ..........................................................................................................................................  Reviewed by Signature/Title    ...................................................              ..............................................  Date                                                            Time

## 2017-08-07 NOTE — DISCHARGE INSTRUCTIONS
Discharge Instructions  Back Pain  You were seen today for back pain. Back pain can have many causes, but most will get better without surgery or other specific treatment. Sometimes there is a herniated ( slipped ) disc. We do not usually do MRI scans to look for these right away, since most herniated discs will get better on their own with time.  Today, we did not find any evidence that your back pain was caused by a serious condition. However, sometimes symptoms develop over time and cannot be found during an emergency visit, so it is very important that you follow up with your primary provider.  Generally, every Emergency Department visit should have a follow-up clinic visit with either a primary or a specialty clinic/provider. Please follow-up as instructed by your emergency provider today.    Return to the Emergency Department if:    You develop a fever with your back pain.     You have weakness or change in sensation in one or both legs.    You lose control of your bowels or bladder, or cannot empty your bladder (cannot pee).    Your pain gets much worse.     Follow-up with your provider:    Unless your pain has completely gone away, please make an appointment with your provider within one week. Most of the routine care for back pain is available in a clinic and not the Emergency Department. You may need further management of your back pain, such as more pain medication, imaging such as an X-ray or MRI, or physical therapy.    What can I do to help myself?    Remain Active -- People are often afraid that they will hurt their back further or delay recovery by remaining active, but this is one of the best things you can do for your back. In fact, staying in bed for a long time to rest is not recommended. Studies have shown that people with low back pain recover faster when they remain active. Movement helps to bring blood flow to the muscles and relieve muscle spasms as well as preventing loss of muscle  strength.    Heat -- Using a heating pad can help with low back pain during the first few weeks. Do not sleep with a heating pad, as you can be burned.     Pain medications - You may take a pain medication such as Tylenol  (acetaminophen), Advil , Motrin  (ibuprofen) or Aleve  (naproxen).  If you were given a prescription for medicine here today, be sure to read all of the information (including the package insert) that comes with your prescription.  This will include important information about the medicine, its side effects, and any warnings that you need to know about.  The pharmacist who fills the prescription can provide more information and answer questions you may have about the medicine.  If you have questions or concerns that the pharmacist cannot address, please call or return to the Emergency Department.   Remember that you can always come back to the Emergency Department if you are not able to see your regular provider in the amount of time listed above, if you get any new symptoms, or if there is anything that worries you.

## 2017-08-07 NOTE — ED PROVIDER NOTES
History     Chief Complaint:  Back Pain    HPI   Alexandru Trujillo is a 32 year old female, with a history of cholecystectomy and hysterectomy, who presents with her  to the ED for evaluation of back pain. The patient reports she has had similar back pain in the past due to her job as a . However, past back pain could be treated with her ease of activity at work for a couple of days. The patient reports to the ED today because this episode of back pain has lasted for two weeks. The patient reports her back pain is localized in the lower center and spreads bilaterally. No radiation into her legs. The pain is exacerbated with deep breaths or any movement. The patient has tried a back brace, topical pain relievers, pain relieving patches, Ibuprofen, and Tylenol without alleviation of the pain. The patient denies any fevers, chills, numbness, tingling, abdominal pain, injections, or drug use.    Allergies:  No known drug allergies    Medications:    sucralfate (CARAFATE) 1 GM/10ML suspension   ondansetron (ZOFRAN) 4 MG tablet   ferrous sulfate (SLO-FE) 142 (45 FE) MG TBCR   senna-docusate (SENOKOT-S;PERICOLACE) 8.6-50 MG per tablet     Past Medical History:    Dysmenorrhea  Symptomatic cholelithiasis    Past Surgical History:    Cholecystectomy  Hysterectomy  Salpingectomy  Tubal ligation    Family History:    Asthma    Social History:  Smoking status: Never smoker  Alcohol use: No  Presents to ED with   Marital Status:   [2]     Review of Systems   Constitutional: Negative for chills and fever.   Gastrointestinal: Negative for abdominal pain.   Genitourinary: Negative for difficulty urinating.   Musculoskeletal: Positive for back pain.   Neurological: Negative for weakness and numbness.   All other systems reviewed and are negative.    Physical Exam     Patient Vitals for the past 24 hrs:   BP Temp Temp src Heart Rate Resp SpO2 Weight   08/07/17 0641 131/88 98.3  F (36.8  C) Oral 54  20 99 % 95.3 kg (210 lb)     Physical Exam  Gen: Uncomfortable apperaing adult female sitting upright.  CV: 2+ DP and posterior tibial pulses in the bilateral feet.  GI: Abdomen soft, nontender, nondistended. No palpable mass. No rebound or guarding.  MSK: Tender diffusely in lower back including paraspinal musculature in the lower L3-L5. No palpable crepitus, bony deformity, mass or edema. Normal range of motion of the extremities bilaterally. 5/5 strength bilateral LE with hip flexion, adduction and abduction, knee flexion and extension, foot dorsiflexion and plantarflexion. No edema.   Skin: No rashes, erythema, or ecchymosis. Warm and dry.  Neuro: Alert and oriented. Answers all questions and follows all commands. No focal deficits appreciated. Normal sensation to all dermatomes of the lower extremities bilaterally. 1+ patellar reflex bilaterally. No saddle anesthesia. 5/5 great toe dorsiflexion strength bilaterally. Ambulatory.  Psych: Normal affect.    Emergency Department Course   Interventions:  0718: Ultram 100mg Oral  0718: Flexeril 10nmg Oral  0720: Toradol 60mg Intramuscular  0722: Lidoderm 5% 1 patch Transdermal    Emergency Department Course:  Past medical records, nursing notes, and vitals reviewed.  0656: I performed an exam of the patient and obtained history, as documented above.    Patient reassessed. She notes she is feeling better and is comfortable with plan for going home.     0751: I rechecked the patient. Findings and plan explained to the Patient and spouse. Patient discharged home with instructions regarding supportive care, medications, and reasons to return. The importance of close follow-up was reviewed.     Impression & Plan      Medical Decision Making:  Alexandru Trujillo is a 32 year old female who presents for evaluation of back pain that started two weeks ago.  She has a history of back pain in the past.  There was no known trigger of today's pain, although her job requires frequent  lifting.  Pain has improved with interventions in the emergency department. The patient did not sustain any trauma, therefore x-rays are not necessary due to the low likelihood of fracture or subluxation.  No red flag symptoms to suggest CT and/or MRI is indicated at this point.  There is no clinical evidence of cauda equina syndrome, discitis, spinal/epidural space hematoma or epidural abscess or other worrisome etiology. The neurological exam is normal and the patient's symptoms seem consistent with musculoskeletal issues and significant muscle spasm.  The patient will be discharged with pain medications to use as directed. Ice or heat to the back and stretching exercises. No heavy lifting, bending or twisting. Return if increasing pain, numbness, weakness, or bowel or bladder dysfunction. She was advised to schedule follow-up with her primary doctor within 5-7 days to re-assess symptoms.    Diagnosis:    ICD-10-CM   1. Acute low back pain without sciatica, unspecified back pain laterality M54.5     Disposition: Patient discharged to home with     Discharge Medications:   Details   traMADol (ULTRAM) 50 MG tablet Take 1-2 tablets ( mg) by mouth every 6 hours as needed for pain, Disp-20 tablet, R-0, Local Print      cyclobenzaprine (FLEXERIL) 10 MG tablet Take 1 tablet (10 mg) by mouth 3 times daily as needed for muscle spasms, Disp-30 tablet, R-0, Local Print      lidocaine (LIDODERM) 5 % Patch Place 1 patch onto the skin every 24 hours for 10 daysDisp-10 patch, R-0Local Print     Batool Camarena  8/7/2017   [facility] EMERGENCY DEPARTMENT    I, Batool Camarena, am serving as a scribe at 6:56 AM on 8/7/2017 to document services personally performed by Candy Nieto MD based on my observations and the provider's statements to me.        Candy Nieto MD  08/09/17 0033

## 2017-08-09 ASSESSMENT — ENCOUNTER SYMPTOMS
WEAKNESS: 0
DIFFICULTY URINATING: 0

## 2018-02-21 ENCOUNTER — HOSPITAL ENCOUNTER (EMERGENCY)
Facility: CLINIC | Age: 33
Discharge: HOME | End: 2018-02-21
Attending: EMERGENCY MEDICINE | Admitting: EMERGENCY MEDICINE
Payer: MEDICAID

## 2018-02-21 VITALS
SYSTOLIC BLOOD PRESSURE: 130 MMHG | WEIGHT: 200.62 LBS | OXYGEN SATURATION: 100 % | HEART RATE: 73 BPM | TEMPERATURE: 98.2 F | RESPIRATION RATE: 20 BRPM | DIASTOLIC BLOOD PRESSURE: 79 MMHG | BODY MASS INDEX: 36.69 KG/M2

## 2018-02-21 DIAGNOSIS — J40 LARYNGOTRACHEOBRONCHITIS: ICD-10-CM

## 2018-02-21 LAB
DEPRECATED S PYO AG THROAT QL EIA: NORMAL
SPECIMEN SOURCE: NORMAL

## 2018-02-21 PROCEDURE — 25000131 ZZH RX MED GY IP 250 OP 636 PS 637: Performed by: EMERGENCY MEDICINE

## 2018-02-21 PROCEDURE — 25000132 ZZH RX MED GY IP 250 OP 250 PS 637: Performed by: EMERGENCY MEDICINE

## 2018-02-21 PROCEDURE — 25000125 ZZHC RX 250: Performed by: EMERGENCY MEDICINE

## 2018-02-21 PROCEDURE — 99283 EMERGENCY DEPT VISIT LOW MDM: CPT

## 2018-02-21 PROCEDURE — 87880 STREP A ASSAY W/OPTIC: CPT | Performed by: EMERGENCY MEDICINE

## 2018-02-21 PROCEDURE — 87081 CULTURE SCREEN ONLY: CPT | Performed by: EMERGENCY MEDICINE

## 2018-02-21 RX ORDER — CODEINE PHOSPHATE AND GUAIFENESIN 10; 100 MG/5ML; MG/5ML
1 SOLUTION ORAL EVERY 4 HOURS PRN
Qty: 120 ML | Refills: 0 | Status: SHIPPED | OUTPATIENT
Start: 2018-02-21 | End: 2018-04-02

## 2018-02-21 RX ORDER — BENZONATATE 200 MG/1
200 CAPSULE ORAL 3 TIMES DAILY PRN
Qty: 21 CAPSULE | Refills: 0 | Status: SHIPPED | OUTPATIENT
Start: 2018-02-21 | End: 2018-04-02

## 2018-02-21 RX ORDER — IBUPROFEN 600 MG/1
600 TABLET, FILM COATED ORAL ONCE
Status: COMPLETED | OUTPATIENT
Start: 2018-02-21 | End: 2018-02-21

## 2018-02-21 RX ORDER — IBUPROFEN 600 MG/1
600 TABLET, FILM COATED ORAL EVERY 8 HOURS PRN
Qty: 30 TABLET | Refills: 0 | Status: SHIPPED | OUTPATIENT
Start: 2018-02-21

## 2018-02-21 RX ADMIN — IBUPROFEN 600 MG: 600 TABLET ORAL at 20:58

## 2018-02-21 RX ADMIN — TOPICAL ANESTHETIC 0.5 ML: 200 SPRAY DENTAL; PERIODONTAL at 20:58

## 2018-02-21 RX ADMIN — DEXAMETHASONE 6 MG: 2 TABLET ORAL at 20:58

## 2018-02-21 ASSESSMENT — ENCOUNTER SYMPTOMS
DYSURIA: 0
HEMATURIA: 0
VOMITING: 1
DIARRHEA: 0
CONSTIPATION: 0
FEVER: 1
COUGH: 1
NAUSEA: 1

## 2018-02-21 NOTE — ED AVS SNAPSHOT
[facility] Emergency Department    [address]    [facility] [address]    Phone:  [phone]    Fax:  [phone]                                       Alexandru Trujillo   MRN: [id number]    Department:  [facility] Emergency Department   Date of Visit:  2/21/2018           After Visit Summary Signature Page     I have received my discharge instructions, and my questions have been answered. I have discussed any challenges I see with this plan with the nurse or doctor.    ..........................................................................................................................................  Patient/Patient Representative Signature      ..........................................................................................................................................  Patient Representative Print Name and Relationship to Patient    ..................................................               ................................................  Date                                            Time    ..........................................................................................................................................  Reviewed by Signature/Title    ...................................................              ..............................................  Date                                                            Time

## 2018-02-21 NOTE — ED AVS SNAPSHOT
[facility] Emergency Department    [address]    [facility] [address]    Phone:  [phone]    Fax:  [phone]                                       Alexandru Trujillo   MRN: [id number]    Department:  [facility] Emergency Department   Date of Visit:  2/21/2018           Patient Information     Date Of Birth          1985        Your diagnoses for this visit were:     Laryngotracheobronchitis        You were seen by Krystian Barnard MD.      Follow-up Information     Follow up with Eveline Olivares MD. Schedule an appointment as soon as possible for a visit in 3 days.    Specialty:  Internal Medicine    Why:  For close follow up    Contact information:    [address] [facility] [id number] [phone]          Discharge Instructions         Over the counter Throat Lozenges can help numb the throat, soothe the pain, and decrease your cough. Cepacol or other lozenges that contain a numbing medicine is most helpful.     Laryngitis    Laryngitis is a swelling of the tissues around the vocal cords. Symptoms include a hoarse (scratchy) voice. The voice may be lost completely. It may be caused by a viral illness, such as a head or chest cold. It may also be due to overuse and strain of the voice. Smoking, drinking alcohol, acid reflux, allergies, or inhaling harsh chemicals may also lead to symptoms. This condition will usually resolve in 1-2 weeks.  Home care    Rest your voice until it recovers. Talk as little as possible. If your symptoms are severe, rest at home for a day or so.    Breathing cool steam from a humidifier/vaporizer or in a steamy shower may be helpful.    Drink plenty of fluids to stay well hydrated.    Do not smoke  Follow-up care  Follow up with your healthcare provider or this facility if you have not improved after one week.  When to seek medical advice  Contact your healthcare provider for any of the following:    Severe pain with  swallowing    Trouble opening mouth    Neck swelling, neck pain, or trouble moving neck    Noisy breathing or trouble breathing    Fever of 100.4 F (38. C) or higher, or as directed by your healthcare provider    Drooling    Symptoms do not resolve in 2 weeks  Date Last Reviewed: 4/26/2015 2000-2017 The "[employer]. [address] [place] [address], [place] [address]. All rights reserved. This information is not intended as a substitute for professional medical care. Always follow your healthcare professional's instructions.        Self-Care for Sore Throats    Sore throats happen for many reasons, such as colds, allergies, and infections caused by viruses or bacteria. In any case, your throat becomes red and sore. Your goal for self-care is to reduce your discomfort while giving your throat a chance to heal.  Moisten and soothe your throat  Tips include the following:    Try a sip of water first thing after waking up.    Keep your throat moist by drinking 6 or more glasses of clear liquids every day.    Run a cool-air humidifier in your room overnight.    Avoid cigarette smoke.     Suck on throat lozenges, cough drops, hard candy, ice chips, or frozen fruit-juice bars. Use the sugar-free versions if your diet or medical condition requires them.  Gargle to ease irritation  Gargling every hour or 2 can ease irritation. Try gargling with 1 of these solutions:    1/4 teaspoon of salt in 1/2 cup of warm water    An over-the-counter anesthetic gargle  Use medicine for more relief  Over-the-counter medicine can reduce sore throat symptoms. Ask your pharmacist if you have questions about which medicine to use:    Ease pain with anesthetic sprays. Aspirin or an aspirin substitute also helps. Remember, never give aspirin to anyone 18 or younger, or if you are already taking blood thinners.     For sore throats caused by allergies, try antihistamines to block the allergic reaction.    Remember: unless a sore throat is  caused by a bacterial infection, antibiotics won t help you.  Prevent future sore throats  Prevention tips include the following:    Stop smoking or reduce contact with secondhand smoke. Smoke irritates the tender throat lining.    Limit contact with pets and with allergy-causing substances, such as pollen and mold.    When you re around someone with a sore throat or cold, wash your hands often to keep viruses or bacteria from spreading.    Don t strain your vocal cords.  Call your healthcare provider  Contact your healthcare provider if you have:    A temperature over 101 F (38.3 C)    White spots on the throat    Great difficulty swallowing    Trouble breathing    A skin rash    Recent exposure to someone else with strep bacteria    Severe hoarseness and swollen glands in the neck or jaw   Date Last Reviewed: 8/1/2016 2000-2017 [employer]. [address] [place] [address]. All rights reserved. This information is not intended as a substitute for professional medical care. Always follow your healthcare professional's instructions.          24 Hour Appointment Hotline       To make an appointment at any [facility] clinic, call [phone] ([phone]). If you don't have a family doctor or clinic, we will help you find one. [facility] clinics are conveniently located to serve the needs of you and your family.             Review of your medicines      START taking        Dose / Directions Last dose taken    acetaminophen 500 MG Caps   Dose:  2 capsule   Quantity:  30 capsule        Take 2 capsules by mouth every 8 hours as needed For aches, pain, fever   Refills:  0        benzonatate 200 MG capsule   Commonly known as:  TESSALON   Dose:  200 mg   Quantity:  21 capsule        Take 1 capsule (200 mg) by mouth 3 times daily as needed for cough   Refills:  0        guaiFENesin-codeine 100-10 MG/5ML Soln solution   Commonly known as:  ROBITUSSIN AC   Dose:  1 tsp.   Quantity:  120 mL         Take 5 mLs by mouth every 4 hours as needed for cough   Refills:  0        ibuprofen 600 MG tablet   Commonly known as:  ADVIL/MOTRIN   Dose:  600 mg   Quantity:  30 tablet        Take 1 tablet (600 mg) by mouth every 8 hours as needed for moderate pain   Refills:  0          Our records show that you are taking the medicines listed below. If these are incorrect, please call your family doctor or clinic.        Dose / Directions Last dose taken    cyclobenzaprine 10 MG tablet   Commonly known as:  FLEXERIL   Dose:  10 mg   Quantity:  30 tablet        Take 1 tablet (10 mg) by mouth 3 times daily as needed for muscle spasms   Refills:  0        ferrous sulfate 142 (45 FE) MG Tbcr   Commonly known as:  SLO-FE   Dose:  142 mg   Quantity:  90 tablet        Take 1 tablet (142 mg) by mouth daily   Refills:  3        ondansetron 4 MG tablet   Commonly known as:  ZOFRAN   Dose:  4 mg   Quantity:  18 tablet        Take 1 tablet (4 mg) by mouth every 8 hours as needed for nausea   Refills:  0        senna-docusate 8.6-50 MG per tablet   Commonly known as:  SENOKOT-S;PERICOLACE   Dose:  2 tablet   Quantity:  60 tablet        Take 2 tablets by mouth daily   Refills:  1        sucralfate 1 GM/10ML suspension   Commonly known as:  CARAFATE   Dose:  1 g   Quantity:  420 mL        Take 10 mLs (1 g) by mouth 4 times daily   Refills:  1        traMADol 50 MG tablet   Commonly known as:  ULTRAM   Dose:   mg   Quantity:  20 tablet        Take 1-2 tablets ( mg) by mouth every 6 hours as needed for pain   Refills:  0                Prescriptions were sent or printed at these locations (4 Prescriptions)                   Other Prescriptions                Printed at Department/Unit printer (4 of 4)         benzonatate (TESSALON) 200 MG capsule               guaiFENesin-codeine (ROBITUSSIN AC) 100-10 MG/5ML SOLN solution               acetaminophen 500 MG CAPS               ibuprofen (ADVIL/MOTRIN) 600 MG tablet                 Procedures and tests performed during your visit     Beta strep group A culture    Rapid strep screen      Orders Needing Specimen Collection     None      Pending Results     Date and Time Order Name Status Description    2/21/2018 2102 Beta strep group A culture In process             Pending Culture Results     Date and Time Order Name Status Description    2/21/2018 2102 Beta strep group A culture In process             Pending Results Instructions     If you had any lab results that were not finalized at the time of your Discharge, you can call the ED Lab Result RN at [phone]. You will be contacted by this team for any positive Lab results or changes in treatment. The nurses are available 7 days a week from 10A to 6:30P.  You can leave a message 24 hours per day and they will return your call.        Test Results From Your Hospital Stay        2/21/2018  9:31 PM      Component Results     Component    Specimen Description    Throat    Rapid Strep A Screen    NEGATIVE: No Group A streptococcal antigen detected by immunoassay, await culture report.         2/21/2018  9:32 PM                Clinical Quality Measure: Blood Pressure Screening     Your blood pressure was checked while you were in the emergency department today. The last reading we obtained was  BP: 130/79 . Please read the guidelines below about what these numbers mean and what you should do about them.  If your systolic blood pressure (the top number) is less than 120 and your diastolic blood pressure (the bottom number) is less than 80, then your blood pressure is normal. There is nothing more that you need to do about it.  If your systolic blood pressure (the top number) is 120-139 or your diastolic blood pressure (the bottom number) is 80-89, your blood pressure may be higher than it should be. You should have your blood pressure rechecked within a year by a primary care provider.  If your systolic blood pressure (the top number) is 140 or  "greater or your diastolic blood pressure (the bottom number) is 90 or greater, you may have high blood pressure. High blood pressure is treatable, but if left untreated over time it can put you at risk for heart attack, stroke, or kidney failure. You should have your blood pressure rechecked by a primary care provider within the next 4 weeks.  If your provider in the emergency department today gave you specific instructions to follow-up with your doctor or provider even sooner than that, you should follow that instruction and not wait for up to 4 weeks for your follow-up visit.        Thank you for choosing [facility]       Thank you for choosing [facility] for your care. Our goal is always to provide you with excellent care. Hearing back from our patients is one way we can continue to improve our services. Please take a few minutes to complete the written survey that you may receive in the mail after you visit with us. Thank you!        [employer] Information     [employer] lets you send messages to your doctor, view your test results, renew your prescriptions, schedule appointments and more. To sign up, go to [email or web address] . Click on \"Log in\" on the left side of the screen, which will take you to the Welcome page. Then click on \"Sign up Now\" on the right side of the page.     You will be asked to enter the access code listed below, as well as some personal information. Please follow the directions to create your username and password.     Your access code is: [email or web address]  Expires: 2018 10:09 PM     Your access code will  in 90 days. If you need help or a new code, please call your [facility] clinic or [phone].        Care EveryWhere ID     This is your Care EveryWhere ID. This could be used by other organizations to access your [facility] medical records  [id number]        Equal Access to Services     TD PARIS AH: sampson Charles qaybta kaalmada adeegyada, waxay " ely nayak ah. So [facility] [phone].    ATENCIÓN: Si habla español, tiene a sears disposición servicios gratuitos de asistencia lingüística. Llame al [phone].    We comply with applicable federal civil rights laws and [place] laws. We do not discriminate on the basis of race, color, national origin, age, disability, sex, sexual orientation, or gender identity.            After Visit Summary       This is your record. Keep this with you and show to your community pharmacist(s) and doctor(s) at your next visit.

## 2018-02-21 NOTE — LETTER
February 21, 2018      To Whom It May Concern:      Alexandru Trujillo was seen in our Emergency Department today, 02/21/18.  I expect her condition to improve over the next 2-3 days.  She may return to work when improved.    Sincerely,        Krystian Barnard MD

## 2018-02-22 NOTE — ED PROVIDER NOTES
History     Chief Complaint:  Headache    HPI   Alexandru Trujillo is a generally heathy 32 year old female who presents with headache and cough. The patient states that she had developed sinus pressure, rhinorrhea, and headache approximately 3 days ago. Since then the patient states that she had developed coughing and emesis after intense coughing spells in addition to a subjective fever, prompting her ED visit. She notes that her voice has become hoarse and weak from coughing so much. The cough is non productive. Here, the patient denies changes in urinary symptoms or bowel movements. She notes that she was exposed to a similar illness at work from a co worker. She reports CP from coughing but denies SOB. She reports that she has not used any medications at home to help with her symptoms.      Allergies:  NKDA     Medications:    Tramadol  Carafate  Senokot    Past Medical History:    Excessive or frequent menstruation    Past Surgical History:    Cystoscopy  Cholecystectomy  Hysterectomy supracervical  Salpingectomy  Tubal ligation    Family History:    Asthma    Social History:  Presents with her .  Negative for alcohol use.  Negative for tobacco use.   Marital Status:   [2]     Review of Systems   Constitutional: Positive for fever (subjective).   Respiratory: Positive for cough.    Gastrointestinal: Positive for nausea and vomiting. Negative for constipation and diarrhea.   Genitourinary: Negative for dysuria and hematuria.   All other systems reviewed and are negative.      Physical Exam   First Vitals:  BP: 130/79  Pulse: 73  Temp: 98.2  F (36.8  C)  Resp: 20  Weight: 91 kg (200 lb 9.9 oz)  SpO2: 100 %    Physical Exam  General: Nontoxic. Frequent dry cough and gagging.   Head:  Scalp, face, and head appear normal  Eyes:  Pupils equal, round, and reactive to light    Conjunctivae noninjected and sclera white  ENT:    The nose is normal, turbinates normal, no purulence or  congestion    Ears/pinnae are normal. Bilateral TMs clear without erythema, bulging, or effusion. Auditory canals normal. Posterior pharynx clear without swelling, exudates. Mild erythema. MMM. Voice hoarse.   Neck:  Normal range of motion. No significant cervical lymphadenopathy  CV:  RRR, no M/R/G  Resp:  CTAB, no increased WOB. No rales, wheezing or rhonchi  MSK:  Normal tone  Skin:  No rash or lesions noted.  Neuro:  Speech is normal and fluent    Moves all extremities spontaneously  Psych: Awake, Alert. Normal affect      Appropriate interactions          Emergency Department Course     Laboratory:  Rapid strep screen: Negative  Beta strep group A culture: In process    Interventions:  2058 Ibuprofen, 600 mg, PO  2058 Hurricaine, 0.5 mL, Throat given  2058 Decadron, 6 mg, PO     Emergency Department Course:  Nursing notes and vitals reviewed.     2033  I performed an exam of the patient as documented above.     Medicine administered as documented above. Throat swab obtained. This was sent to the lab for further testing, results above.    2201 I rechecked the patient and discussed the results of her workup thus far.     Findings and plan explained to the Patient. Patient discharged home with instructions regarding supportive care, medications, and reasons to return. The importance of close follow-up was reviewed. The patient was prescribed Tylenol, Tessalon, Robitussin, and ibuprofen.    I personally reviewed the laboratory results with the Patient and answered all related questions prior to discharge.       Impression & Plan      Medical Decision Making:  The patient presented for cough, URI symptoms and loss of voice.  Evaluation today showed viral bronchitis/laryngitis.  No evidence of pneumonia.  No clinical concern for cardiac cause of symptoms or PE.  No evidence of respiratory failure.  Based on time course of illness, I suspect viral process and no indication for antibiotics at this time.  Plan for  symptomatic management. Single dose of dexamethasone given in the ED. Hurricaine spray significantly improved her subjective symptoms. I discussed symptomatic care options for treatment at home. Patient felt improved after above interventions. Rapid strep negative. Close PCP follow up encouraged. Return precautions were discussed with patient. The patient's questions were answered and the patient was agreeable with discharge.    Diagnosis:    ICD-10-CM   1. Laryngotracheobronchitis J40       Disposition:  discharged to home    Discharge Medications:  New Prescriptions    ACETAMINOPHEN 500 MG CAPS    Take 2 capsules by mouth every 8 hours as needed For aches, pain, fever    BENZONATATE (TESSALON) 200 MG CAPSULE    Take 1 capsule (200 mg) by mouth 3 times daily as needed for cough    GUAIFENESIN-CODEINE (ROBITUSSIN AC) 100-10 MG/5ML SOLN SOLUTION    Take 5 mLs by mouth every 4 hours as needed for cough    IBUPROFEN (ADVIL/MOTRIN) 600 MG TABLET    Take 1 tablet (600 mg) by mouth every 8 hours as needed for moderate pain     I, Phoebe Estevez, am serving as a scribe on 2/21/2018 at 8:33 PM to personally document services performed by Krystian Barnard MD based on my observations and the provider's statements to me.      Phoebe Estevez  2/21/2018   [facility] EMERGENCY DEPARTMENT       Krystian Barnard MD  02/22/18 2205

## 2018-02-22 NOTE — DISCHARGE INSTRUCTIONS
Over the counter Throat Lozenges can help numb the throat, soothe the pain, and decrease your cough. Cepacol or other lozenges that contain a numbing medicine is most helpful.     Laryngitis    Laryngitis is a swelling of the tissues around the vocal cords. Symptoms include a hoarse (scratchy) voice. The voice may be lost completely. It may be caused by a viral illness, such as a head or chest cold. It may also be due to overuse and strain of the voice. Smoking, drinking alcohol, acid reflux, allergies, or inhaling harsh chemicals may also lead to symptoms. This condition will usually resolve in 1-2 weeks.  Home care    Rest your voice until it recovers. Talk as little as possible. If your symptoms are severe, rest at home for a day or so.    Breathing cool steam from a humidifier/vaporizer or in a steamy shower may be helpful.    Drink plenty of fluids to stay well hydrated.    Do not smoke  Follow-up care  Follow up with your healthcare provider or this facility if you have not improved after one week.  When to seek medical advice  Contact your healthcare provider for any of the following:    Severe pain with swallowing    Trouble opening mouth    Neck swelling, neck pain, or trouble moving neck    Noisy breathing or trouble breathing    Fever of 100.4 F (38. C) or higher, or as directed by your healthcare provider    Drooling    Symptoms do not resolve in 2 weeks  Date Last Reviewed: 4/26/2015 2000-2017 [employer]. [address] [place] [address]. All rights reserved. This information is not intended as a substitute for professional medical care. Always follow your healthcare professional's instructions.        Self-Care for Sore Throats    Sore throats happen for many reasons, such as colds, allergies, and infections caused by viruses or bacteria. In any case, your throat becomes red and sore. Your goal for self-care is to reduce your discomfort while giving your throat a chance to  heal.  Moisten and soothe your throat  Tips include the following:    Try a sip of water first thing after waking up.    Keep your throat moist by drinking 6 or more glasses of clear liquids every day.    Run a cool-air humidifier in your room overnight.    Avoid cigarette smoke.     Suck on throat lozenges, cough drops, hard candy, ice chips, or frozen fruit-juice bars. Use the sugar-free versions if your diet or medical condition requires them.  Gargle to ease irritation  Gargling every hour or 2 can ease irritation. Try gargling with 1 of these solutions:    1/4 teaspoon of salt in 1/2 cup of warm water    An over-the-counter anesthetic gargle  Use medicine for more relief  Over-the-counter medicine can reduce sore throat symptoms. Ask your pharmacist if you have questions about which medicine to use:    Ease pain with anesthetic sprays. Aspirin or an aspirin substitute also helps. Remember, never give aspirin to anyone 18 or younger, or if you are already taking blood thinners.     For sore throats caused by allergies, try antihistamines to block the allergic reaction.    Remember: unless a sore throat is caused by a bacterial infection, antibiotics won t help you.  Prevent future sore throats  Prevention tips include the following:    Stop smoking or reduce contact with secondhand smoke. Smoke irritates the tender throat lining.    Limit contact with pets and with allergy-causing substances, such as pollen and mold.    When you re around someone with a sore throat or cold, wash your hands often to keep viruses or bacteria from spreading.    Don t strain your vocal cords.  Call your healthcare provider  Contact your healthcare provider if you have:    A temperature over 101 F (38.3 C)    White spots on the throat    Great difficulty swallowing    Trouble breathing    A skin rash    Recent exposure to someone else with strep bacteria    Severe hoarseness and swollen glands in the neck or jaw   Date Last Reviewed:  8/1/2016 2000-2017 [employer]. [address] [place] [address], [place] [address]. All rights reserved. This information is not intended as a substitute for professional medical care. Always follow your healthcare professional's instructions.

## 2018-02-24 LAB
BACTERIA SPEC CULT: NORMAL
SPECIMEN SOURCE: NORMAL

## 2018-04-02 ENCOUNTER — OFFICE VISIT (OUTPATIENT)
Dept: INTERNAL MEDICINE | Facility: CLINIC | Age: 33
End: 2018-04-02
Payer: COMMERCIAL

## 2018-04-02 VITALS
SYSTOLIC BLOOD PRESSURE: 100 MMHG | DIASTOLIC BLOOD PRESSURE: 60 MMHG | WEIGHT: 205.6 LBS | HEIGHT: 64 IN | HEART RATE: 68 BPM | BODY MASS INDEX: 35.1 KG/M2 | TEMPERATURE: 98.5 F | RESPIRATION RATE: 16 BRPM | OXYGEN SATURATION: 99 %

## 2018-04-02 DIAGNOSIS — M54.41 CHRONIC BILATERAL LOW BACK PAIN WITH RIGHT-SIDED SCIATICA: Primary | ICD-10-CM

## 2018-04-02 DIAGNOSIS — G89.29 CHRONIC BILATERAL LOW BACK PAIN WITH RIGHT-SIDED SCIATICA: Primary | ICD-10-CM

## 2018-04-02 PROCEDURE — 99214 OFFICE O/P EST MOD 30 MIN: CPT | Performed by: INTERNAL MEDICINE

## 2018-04-02 RX ORDER — METHYLPREDNISOLONE 4 MG
TABLET, DOSE PACK ORAL
Qty: 21 TABLET | Refills: 0 | Status: SHIPPED | OUTPATIENT
Start: 2018-04-02 | End: 2018-05-02

## 2018-04-02 ASSESSMENT — PAIN SCALES - GENERAL: PAINLEVEL: NO PAIN (0)

## 2018-04-02 NOTE — PROGRESS NOTES
"  SUBJECTIVE:                                                      HPI: Alexandru Trujillo is a pleasant 32 year old female who presents with back pain:    Accompanied by .    - ongoing for years, but getting worse over time  - lower back, midline and bilateral  - radiates down right leg to her foot  - sharp and shooting in quality  - severe in severity  - worse with certain movements like standing up, sitting down, twisting, and bending over    - no lower extremity numbness, tingling, or weakness  - no urinary retention or incontinence  - no fecal incontinence    - no improvement with multiple prescription and OTC medications including...   - Flexeril, ibuprofen, naproxen, Excedrin, aspirin, IcyHot, and lidocaine ointment    No history of back injuries, procedures, or surgeries.    PMH significant for obesity and difficulty managing pain.     SH significant for lack of regular exercise.    The medication, allergy, and problem lists have been reviewed and updated as appropriate.       OBJECTIVE:                                                      /60 (BP Location: Left arm, Patient Position: Chair, Cuff Size: Adult Large)  Pulse 68  Temp 98.5  F (36.9  C) (Oral)  Resp 16  Ht 5' 3.5\" (1.613 m)  Wt 205 lb 9.6 oz (93.3 kg)  LMP 10/26/2016  SpO2 99%  BMI 35.85 kg/m2  Constitutional: uncomfortable and tearful throughout visit  Lumbar spine: no deformity, crepitus, or step-off; spinal tenderness to palpation throughout; bilateral paraspinal tenderness to palpation throughout; right lower back pain worsened with bilateral straight leg raise, but no radiculopathy with straight leg raise  Lower extremities: strength normal      ASSESSMENT/PLAN:                                                      (M54.41,  G89.29) Chronic bilateral low back pain with right-sided sciatica  (primary encounter diagnosis)  Comment: obesity and lack of exercise likely contributing.  Plan:    - Medrol Dosepak prescribed.   - " lumbar MRI ordered patient to schedule.   - recommendations to follow (PT, SANDRA, surgical evaluation).     The instructions on the AVS were discussed and explained to the patient. Patient expressed understanding of instructions.    (Chart documentation was completed, in part, with [email or web address] voice-recognition software. Even though reviewed, some grammatical, spelling, and word errors may remain.)    Eveline Olivares MD   [address] [address]  T: [phone], F: [phone]

## 2018-04-02 NOTE — MR AVS SNAPSHOT
"              After Visit Summary   4/2/2018    Alexandru Trujillo    MRN: [id number]           Patient Information     Date Of Birth          1985        Visit Information        Provider Department      4/2/2018 2:30 PM Eveline Olivares MD [facility]        Today's Diagnoses     Chronic bilateral low back pain with right-sided sciatica    -  1      Care Instructions    Medrol dose pack - use as directed.     ---    Please schedule MRI on the way out.           Follow-ups after your visit        Future tests that were ordered for you today     Open Future Orders        Priority Expected Expires Ordered    MR Lumbar Spine w/o Contrast Routine  4/2/2019 4/2/2018            Who to contact     If you have questions or need follow up information about today's clinic visit or your schedule please contact [facility] directly at [phone].  Normal or non-critical lab and imaging results will be communicated to you by [employer], letter or phone within 4 business days after the clinic has received the results. If you do not hear from us within 7 days, please contact the clinic through MyChart or phone. If you have a critical or abnormal lab result, we will notify you by phone as soon as possible.  Submit refill requests through [employer] or call your pharmacy and they will forward the refill request to us. Please allow 3 business days for your refill to be completed.          Additional Information About Your Visit        MyChart Information     [employer] lets you send messages to your doctor, view your test results, renew your prescriptions, schedule appointments and more. To sign up, go to [email or web address] . Click on \"Log in\" on the left side of the screen, which will take you to the Welcome page. Then click on \"Sign up Now\" on the right side of the page.     You will be asked to enter the access code listed below, as well as some personal information. Please follow " "the directions to create your username and password.     Your access code is: [email or web address]  Expires: 2018 11:09 PM     Your access code will  in 90 days. If you need help or a new code, please call your [facility] clinic or [phone].        Care EveryWhere ID     This is your Care EveryWhere ID. This could be used by other organizations to access your [facility] medical records  [id number]        Your Vitals Were     Pulse Temperature Respirations Height Last Period Pulse Oximetry    68 98.5  F (36.9  C) (Oral) 16 5' 3.5\" (1.613 m) 10/26/2016 99%    BMI (Body Mass Index)                   35.85 kg/m2            Blood Pressure from Last 3 Encounters:   18 100/60   18 130/79   17 116/79    Weight from Last 3 Encounters:   18 205 lb 9.6 oz (93.3 kg)   18 200 lb 9.9 oz (91 kg)   17 210 lb (95.3 kg)                 Today's Medication Changes          These changes are accurate as of 18  2:52 PM.  If you have any questions, ask your nurse or doctor.               Start taking these medicines.        Dose/Directions    methylPREDNISolone 4 MG tablet   Commonly known as:  MEDROL DOSEPAK   Used for:  Chronic bilateral low back pain with right-sided sciatica   Started by:  Eveline Olivares MD        Follow package instructions   Quantity:  21 tablet   Refills:  0            Where to get your medicines      These medications were sent to [facility] [address] [place] [address] [address] [facility] [place] [address] [address] [facility] [address]    Hours:  24-hours Phone:  [phone]     methylPREDNISolone 4 MG tablet                Primary Care Provider Office Phone # Fax #    Eveline Olivares [phone]       [address] [place] [address]        Equal Access to Services     TD PARIS AH: Hadii rehan watt Soyandel, waaxda luqadaha, qaybta kaalmada adeantoinette marshall. So [facility] " [phone].    ATENCIÓN: Si eddie venegas, tiene a sears disposición servicios gratuitos de asistencia lingüística. Addy mcmillan [phone].    We comply with applicable federal civil rights laws and [place] laws. We do not discriminate on the basis of race, color, national origin, age, disability, sex, sexual orientation, or gender identity.            Thank you!     Thank you for choosing [facility]  for your care. Our goal is always to provide you with excellent care. Hearing back from our patients is one way we can continue to improve our services. Please take a few minutes to complete the written survey that you may receive in the mail after your visit with us. Thank you!             Your Updated Medication List - Protect others around you: Learn how to safely use, store and throw away your medicines at www.disposemymeds.org.          This list is accurate as of 4/2/18  2:52 PM.  Always use your most recent med list.                   Brand Name Dispense Instructions for use Diagnosis    acetaminophen 500 MG Caps     30 capsule    Take 2 capsules by mouth every 8 hours as needed For aches, pain, fever        cyclobenzaprine 10 MG tablet    FLEXERIL    30 tablet    Take 1 tablet (10 mg) by mouth 3 times daily as needed for muscle spasms        ibuprofen 600 MG tablet    ADVIL/MOTRIN    30 tablet    Take 1 tablet (600 mg) by mouth every 8 hours as needed for moderate pain        methylPREDNISolone 4 MG tablet    MEDROL DOSEPAK    21 tablet    Follow package instructions    Chronic bilateral low back pain with right-sided sciatica       ondansetron 4 MG tablet    ZOFRAN    18 tablet    Take 1 tablet (4 mg) by mouth every 8 hours as needed for nausea

## 2018-04-06 ENCOUNTER — HOSPITAL ENCOUNTER (OUTPATIENT)
Dept: MRI IMAGING | Facility: CLINIC | Age: 33
Discharge: HOME | End: 2018-04-06
Attending: INTERNAL MEDICINE | Admitting: INTERNAL MEDICINE
Payer: COMMERCIAL

## 2018-04-06 DIAGNOSIS — M54.41 CHRONIC BILATERAL LOW BACK PAIN WITH RIGHT-SIDED SCIATICA: ICD-10-CM

## 2018-04-06 DIAGNOSIS — G89.29 CHRONIC BILATERAL LOW BACK PAIN WITH RIGHT-SIDED SCIATICA: ICD-10-CM

## 2018-04-06 PROCEDURE — 72148 MRI LUMBAR SPINE W/O DYE: CPT

## 2018-04-10 DIAGNOSIS — G89.29 CHRONIC BILATERAL LOW BACK PAIN WITH RIGHT-SIDED SCIATICA: Primary | ICD-10-CM

## 2018-04-10 DIAGNOSIS — M54.41 CHRONIC BILATERAL LOW BACK PAIN WITH RIGHT-SIDED SCIATICA: Primary | ICD-10-CM

## 2018-04-20 ENCOUNTER — TELEPHONE (OUTPATIENT)
Dept: INTERNAL MEDICINE | Facility: CLINIC | Age: 33
End: 2018-04-20

## 2018-04-20 NOTE — TELEPHONE ENCOUNTER
Reason for Call:  Form, our goal is to have forms completed with 72 hours, however, some forms may require a visit or additional information.    Type of letter, form or note:  FMLA    Who is the form from?: Patient    Where did the form come from: form was faxed in    What clinic location was the form placed at?: Internal Medicine    Where the form was placed: 's Box    What number is listed as a contact on the form?: [phone]       Additional comments: Fax to [phone]    Call taken on 4/20/2018 at 4:48 PM by Manoj Reyez

## 2018-04-21 ENCOUNTER — THERAPY VISIT (OUTPATIENT)
Dept: PHYSICAL THERAPY | Facility: CLINIC | Age: 33
End: 2018-04-21
Payer: COMMERCIAL

## 2018-04-21 DIAGNOSIS — M54.41 CHRONIC BILATERAL LOW BACK PAIN WITH RIGHT-SIDED SCIATICA: Primary | ICD-10-CM

## 2018-04-21 DIAGNOSIS — G89.29 CHRONIC BILATERAL LOW BACK PAIN WITH RIGHT-SIDED SCIATICA: Primary | ICD-10-CM

## 2018-04-21 PROCEDURE — 97110 THERAPEUTIC EXERCISES: CPT | Mod: GP | Performed by: PHYSICAL THERAPIST

## 2018-04-21 PROCEDURE — 97161 PT EVAL LOW COMPLEX 20 MIN: CPT | Mod: GP | Performed by: PHYSICAL THERAPIST

## 2018-04-21 PROCEDURE — 97112 NEUROMUSCULAR REEDUCATION: CPT | Mod: GP | Performed by: PHYSICAL THERAPIST

## 2018-04-21 NOTE — PROGRESS NOTES
[facility] Initial Evaluation  Subjective:  Patient is a 32 year old female presenting with rehab back hpi. The history is provided by the patient. No  was used.   Alexandru Trujillo is a 32 year old female with a lumbar condition.  Condition occurred with:  Insidious onset.  Condition occurred: for unknown reasons.  This is a chronic condition  Pt c/o chronic (years) LBP.  Initially thought pain was related to mentral issues but had surgical intervention 10/2017 with no change in LBP.  MD order date 4/10/18.  No change with medrol dose pack..    Patient reports pain:  Central lumbar spine, upper lumbar spine, mid lumbar spine, lower lumbar spine and lumbar spine right.  Radiates to:  Gluteals right, thigh right, knee right, lower leg right and foot right.  Pain is described as shooting, stabbing and aching and is constant and reported as 6/10 and 10/10.  Associated symptoms:  Loss of strength and loss of motion/stiffness. Pain is worse in the P.M. and worse during the day.  Symptoms are exacerbated by bending, twisting and lifting and relieved by rest, muscle relaxants, NSAID's, analgesics, bracing/immobilizing and heat.  Since onset symptoms are unchanged.  Special tests:  MRI (L4-5 and L5-S1 DJD, mild disc bulge and stenosis).      General health as reported by patient is good.    Medical allergies: no.    Current medications:  Muscle relaxants.  Current occupation is .    Primary job tasks include:  Repetitive tasks, operating a machine, lifting and other (push/pull).                                Objective:  System         Lumbar/SI Evaluation  ROM:  Arom wnl lumbar: prone pain during, increased pain after/decreased ROM.  No tolerance with RFIS .  Pt became tearful during ROM testing.  AROM Lumbar:   Flexion:          Max loss ++ PUR  Ext:                    Mod loss +   Side Bend:        Left:  Min loss ++    Right:  Min loss +  Rotation:           Left:      Right:   Side Glide:        Left:     Right:         Strength: poor core stab  Lumbar Myotomes:  Lumbar myotomes: grossly 4/5 R-pain limited with all mm testing.                Lumbar Dermtomes:  normal                  Lumbar Palpation:    Tenderness present at Left:    Erector Spinae  Tenderness present at Right: Erector Spinae; Piriformis and PSIS                                                     General     ROS    Assessment/Plan:    Patient is a 32 year old female with lumbar complaints.    Patient has the following significant findings with corresponding treatment plan.                Diagnosis 1:  LBP  Pain -  hot/cold therapy, US, electric stimulation, manual therapy, directional preference exercise and home program  Decreased ROM/flexibility - manual therapy and therapeutic exercise  Decreased strength - therapeutic exercise and therapeutic activities  Impaired gait - gait training  Impaired muscle performance - neuro re-education  Decreased function - therapeutic activities  Impaired posture - neuro re-education    Therapy Evaluation Codes:   1) History comprised of:   Personal factors that impact the plan of care:      Past/current experiences and Time since onset of symptoms.    Comorbidity factors that impact the plan of care are:      None.     Medications impacting care: Muscle relaxant and Steroids.  2) Examination of Body Systems comprised of:   Body structures and functions that impact the plan of care:      Lumbar spine.   Activity limitations that impact the plan of care are:      Bathing, Bending, Driving, Dressing, Lifting, Sitting, Squatting/kneeling, Stairs, Standing, Walking, Working, Sleeping and Laying down.  3) Clinical presentation characteristics are:   Stable/Uncomplicated.  4) Decision-Making    Moderate complexity using standardized patient assessment instrument and/or measureable assessment of functional outcome.  Cumulative Therapy Evaluation is: Low complexity.    Previous and  current functional limitations:  (See Goal Flow Sheet for this information)    Short term and Long term goals: (See Goal Flow Sheet for this information)     Communication ability:  Patient appears to be able to clearly communicate and understand verbal and written communication and follow directions correctly.  Treatment Explanation - The following has been discussed with the patient:   RX ordered/plan of care  Anticipated outcomes  Possible risks and side effects  This patient would benefit from PT intervention to resume normal activities.   Rehab potential is good.    Frequency:  1 X week, once daily  Duration:  for 8 weeks  Discharge Plan:  Achieve all LTG.  Independent in home treatment program.  Reach maximal therapeutic benefit.    Please refer to the daily flowsheet for treatment today, total treatment time and time spent performing 1:1 timed codes.

## 2018-04-27 ENCOUNTER — THERAPY VISIT (OUTPATIENT)
Dept: PHYSICAL THERAPY | Facility: CLINIC | Age: 33
End: 2018-04-27
Payer: COMMERCIAL

## 2018-04-27 DIAGNOSIS — G89.29 CHRONIC BILATERAL LOW BACK PAIN WITH RIGHT-SIDED SCIATICA: ICD-10-CM

## 2018-04-27 DIAGNOSIS — M54.41 CHRONIC BILATERAL LOW BACK PAIN WITH RIGHT-SIDED SCIATICA: ICD-10-CM

## 2018-04-27 PROCEDURE — G0283 ELEC STIM OTHER THAN WOUND: HCPCS | Mod: GP | Performed by: PHYSICAL THERAPIST

## 2018-04-27 PROCEDURE — 97110 THERAPEUTIC EXERCISES: CPT | Mod: GP | Performed by: PHYSICAL THERAPIST

## 2018-04-27 NOTE — PROGRESS NOTES
"Subjective:  HPI                    Objective:  System    Physical Exam    General     ROS    Assessment/Plan:    DISCHARGE REPORT    Progress reporting period is from 4/21/2018 to 4/27/2018.       SUBJECTIVE  Subjective changes noted by patient: Went home from work early because of the pain, took a muscle relaxer and just woke up before coming here. Pain is presently just in the middle, but was down to the right leg earlier today. Pain is only on the left side in the low back, not down into the left leg. Exercises are helpful, but hard to always find the time.     Current Pain level: 4/10.     Initial Pain level:  (6-10/10).   Changes in function:  None  Adverse reaction to treatment or activity: treatment - All movements and most positions increase patients pain    OBJECTIVE  Changes noted in objective findings:  None    Patient had increased pain with turning over in bed, sitting up, standing lumbar range of motion. Prone lying decreased pain only slightly and only temporarily.   No response to E-stim.  Patient became tearful due to pain during session.   Stated that she does not think she can do \"this\" (referring to PT) right now.         ASSESSMENT/PLAN  Updated problem list and treatment plan: Diagnosis 1:  Low Back Pain  STG/LTGs have been met or progress has been made towards goals:  None  Assessment of Progress: The patient's condition is unchanged.  Alexandru continues to require the following intervention to meet STG and LTG's:  PT intervention is not appropriate at this time    Recommendations:  This patient would benefit from further evaluation.    Alexandru was not able to effectively participate in PT and it was suggested that she be referred to the pain management clinic for early intervention with the possibility of returning to PT if needed once her pain is amenable to participation in PT.     Please refer to the daily flowsheet for treatment today, total treatment time and time spent performing 1:1 " timed codes.

## 2018-04-30 ENCOUNTER — TELEPHONE (OUTPATIENT)
Dept: PALLIATIVE MEDICINE | Facility: CLINIC | Age: 33
End: 2018-04-30

## 2018-04-30 DIAGNOSIS — M54.41 CHRONIC BILATERAL LOW BACK PAIN WITH RIGHT-SIDED SCIATICA: Primary | ICD-10-CM

## 2018-04-30 DIAGNOSIS — G89.29 CHRONIC BILATERAL LOW BACK PAIN WITH RIGHT-SIDED SCIATICA: Primary | ICD-10-CM

## 2018-04-30 RX ORDER — CYCLOBENZAPRINE HCL 10 MG
10 TABLET ORAL 3 TIMES DAILY PRN
Qty: 30 TABLET | Refills: 0 | Status: SHIPPED | OUTPATIENT
Start: 2018-04-30 | End: 2018-05-02

## 2018-04-30 NOTE — TELEPHONE ENCOUNTER
Pain Management Center Referral      1. Confirmed address with patient? Yes  2. Confirmed phone number with patient? Yes  3. Confirmed referring provider? Yes  4. Is the PCP the same as the referring provider? Yes  5. Has the patient been to any previous pain clinics? No  (If yes, send NOEL with welcome letter)  6. Which insurance are we to bill for this appointment?  [employer]    7. Informed pt of cancellation (48 hour) policy? Yes    REGARDING OPIOID MEDICATIONS: We will always address appropriateness of opioid pain medications, but we generally will not automatically take on a prescribing role. When we do take on prescribing of opioids for chronic pain, it is in collaboration with the referring physician for an intermediate period of time (months), with an expectation that the primary physician or provider will assume the prescribing role if medications are effective at stable doses with demonstrated compliance. Therefore, please do not assume that your prescribing responsibilities end on the day of pain clinic consultation.  7. Informed pt of prescribing policy? Yes      8. Referring Provider: Eveline Olivares

## 2018-04-30 NOTE — TELEPHONE ENCOUNTER
cyclobenzaprine (FLEXERIL) 10 MG tablet      Last Written Prescription Date:  08/07/2017  Last Fill Quantity: 30,   # refills: 0  Last Office Visit: 04/02/2018  Future Office visit:       Routing refill request to provider for review/approval because:  Drug not on the [facility], [facility] or [facility] refill protocol or controlled substance

## 2018-05-01 NOTE — PROGRESS NOTES
"      [facility]     Date of visit: 5/2/2018    Reason for consultation:    Alexandru Trujillo is a 32 year old female who is seen in consultation today at the request of her PCP,  Eveline Olivares for evaluation of her pain issues and recommendations for management, with specific emphasis on  Reason for Referral: Evaluation for comprehensive services-     Please complete the following questions:    Do you have any specific questions for the pain specialist? No    Are there any red flags that may impact the assessment or management of the patient? None      What is your diagnosis for the patient's pain? Lower back pain     Please see the [facility] [place] health questionnaire which the patient completed and reviewed with me in detail.    Review of [place] Prescription Monitoring Program (): No concern for abuse or misuse of controlled medications based on this report.     Pain medications are not currently prescribed.     Subjective:    Chief Complaint:    Chief Complaint   Patient presents with     Pain       Pain history:  Alexandru Trujillo is a 32 year old female who presents for initial evaluation of chief complaint of low back pain.      She first started having problems with low back pain 6-7 years ago. Insidious onset, without acute precipitating event. 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 She states pain was initially thought to be due to menstrual issues, she had a hysterectomy last October without improvement. Her low back pain has been most problematic since last October. She has tried wearing a back brace with short term relief. She has tried lidocaine patches with very short term relief. She has tried using a warm rice sock, states this is only somewhat helpful. She has tried a cream called \"Dragon\" with relief, states she bought this online, this is stronger than Bengay. She has " "been evaluated by her primary care provider for this pain. Recently had an MRI, PT was recommended. She had x2 visits with physical therapy, had to stop as this was aggravating her pain too much. Comprehensive pain management was recommended instead. The pain is located in low back, radiates down right posterior leg. Denies numbness or tingling. Denies weakness.      Pain description:  Location: low back  Quality: shooting  Severity/Intensity: 2/10 at best, 10/10 at worst, 6/10 on average  Aggravating factors include: bending  Relieving factors include: laying down, back brace, lidocaine patches, rice sock    The patient otherwise denies bowel or bladder incontinence, parasthesias, weakness, saddle anesthesia, unintentional weight loss, or fever/chills/sweats.     Alexandru Trujillo has not been seen at a pain clinic in the past.      Pain Treatments:  (H--helped; HI--Helped initially; [facility]--Somewhat helpful; NH--No help; W--worse; SE--side effects; ?--Unsure if helpful)   1. Medications:       Current pain medications:   Tylenol 500mg- [facility]/NH, 4-5 tabs/day   Flexeril 10mg TID prn- [facility], SE,  takes once daily, \"I sleep\"   Ibuprofen 600mg- [facility], takes occasionally, hx of ulcer     Current calculated MME: 0    1. Previous Pain Relevant Medications:  NOTE: This medication information taken from patient's intake form, not medical records.    Opiates: T3- H, Vicodin- H   NSAIDS: ibuprofen- [facility], Naproxen- [facility]    Muscle Relaxants: Flexeril- [facility], SE   Anti-migraine mediations: no   Anti-depressants: no   Sleep aids: no   Anxiolytics: no   Neuropathics: no    Topicals: Dragon (stronger cream than Bengay)- H for couple hours, lidocaine patches- [facility] short term   Other medications not covered above: Tylenol- [facility]/NH    2. Physical Therapy: yes- [facility] April 18- too challenging/flared pain  3. Surgery: no  4. Injections: no  5. Chiropractic: no  6. Acupuncture: no  7. TENS Unit: no, did try something similar at [facility]- " NH    Imaging:  MRI of lumbar spine was completed on 4/6/18 and shows:  T12-L1:  No disc herniation or stenosis. Facet joints are  unremarkable.     L1-L2:  No disc herniation or stenosis. Facet joints are unremarkable.         L2-L3:  No disc herniation or stenosis. Facet joints are unremarkable.     L3-L4:  No disc herniation or stenosis. Facet joints are unremarkable.        L4-L5:  Mild facet degenerative changes. Mild degenerative disc  disease with mild disc bulge. No stenosis.     L5-S1:  Mild facet degenerative changes. Mild disc bulge. No central  stenosis. Mild left foraminal stenosis. Right neural foramen is  patent.     Paraspinous soft tissues:  Unremarkable.         IMPRESSION:    1. At L4-L5 there are mild facet degenerative changes and mild disc  bulge. No stenosis.  2. At L5-S1 there are mild facet degenerative changes and disc bulge  that lateralizes very slightly to the left. Mild left foraminal  stenosis without nerve root compression.    Past Medical History:  Past Medical History:   Diagnosis Date     NO ACTIVE PROBLEMS        Past Surgical History:  Past Surgical History:   Procedure Laterality Date     CYSTOSCOPY N/A 10/26/2016    Procedure: CYSTOSCOPY;  Surgeon: Mahendra Steen MD;  Location:  OR     LAPAROSCOPIC CHOLECYSTECTOMY N/A 6/16/2016    Procedure: LAPAROSCOPIC CHOLECYSTECTOMY;  Surgeon: Boaz Pinto MD;  Location:  OR     LAPAROSCOPIC HYSTERECTOMY SUPRACERVICAL N/A 10/26/2016    Procedure: LAPAROSCOPIC HYSTERECTOMY SUPRACERVICAL;  Surgeon: Mahendra Steen MD;  Location:  OR     LAPAROSCOPIC SALPINGECTOMY Bilateral 10/26/2016    Procedure: LAPAROSCOPIC SALPINGECTOMY;  Surgeon: Mahendra Steen MD;  Location:  OR     TUBAL LIGATION  2009       Medications:  Current Outpatient Prescriptions   Medication Sig Dispense Refill     gabapentin (NEURONTIN) 300 MG capsule Take 2 capsules (600 mg) by mouth 3 times daily 180 capsule 3     methocarbamol  (ROBAXIN) 500 MG tablet Take 1-2 tablets (500-1,000 mg) by mouth 4 times daily as needed for muscle spasms 60 tablet 0     acetaminophen 500 MG CAPS Take 2 capsules by mouth every 8 hours as needed For aches, pain, fever 30 capsule 0     ibuprofen (ADVIL/MOTRIN) 600 MG tablet Take 1 tablet (600 mg) by mouth every 8 hours as needed for moderate pain 30 tablet 0     ondansetron (ZOFRAN) 4 MG tablet Take 1 tablet (4 mg) by mouth every 8 hours as needed for nausea 18 tablet 0       Allergies:   No Known Allergies    Social History:  Home situation: lives in an apartment with  and children  Support system:   Occupation/Schooling:   Tobacco use: no  Drug use: no  Alcohol use: wine monthly or every other month  History of chemical dependency treatment: no  Mental health admissions: no    Family history:  Family History   Problem Relation Age of Onset     DIABETES No family hx of      Myocardial Infarction No family hx of      CEREBROVASCULAR DISEASE No family hx of      Breast Cancer Maternal Grandmother 50     CANCER No family hx of      no colon or ovarian CA     Asthma Brother      Asthma Son      Family history of headaches: no    Review of Systems:    POSTIVE IN BOLD  GENERAL: fever/chills, fatigue, general unwell feeling, weight gain/loss.  HEAD/EYES:  headache, dizziness, or vision changes.    EARS/NOSE/THROAT: nosebleeds, hearing loss, sinus infection, earache, tinnitus.  IMMUNE:  allergies, cancer, immune deficiency, or infections.  SKIN:  itching, rash, hives  HEME/Lymphatic: anemia, easy bruising, easy bleeding.  RESPIRATORY: cough, wheezing, or shortness of breath  CARDIOVASCULAR/Circulation: extremity edema, syncope, hypertension, tachycardia, or angina.  GASTROINTESTINAL: abdominal pain, nausea/emesis, diarrhea, constipation,  hematochezia, or melena.  ENDOCRINE:  diabetes, steroid use,  thyroid disease or osteoporosis.  MUSCULOSKELETAL: joint pain, stiffness, neck pain, back  pain, arthritis, or gout.  GENITOURINARY: frequency, urgency, dysuria, difficulty voiding, hematuria or incontinence.  NEUROLOGIC: weakness, numbness, paresthesias, seizure, tremor, stroke or memory loss.  PSYCHIATRIC: depression, anxiety, stress, suicidal thoughts or mood swings.     Objective:    Physical Exam:  Vitals:    05/02/18 0943   BP: 108/72   Pulse: 56   SpO2: 98%   Weight: 93 kg (205 lb)     Exam:  Constitutional: Well developed, well nourished, appears stated age. Overweight.   HEENT: Head atraumatic, normocephalic. Eyes without conjunctival injection or jaundice. Oropharynx clear. Neck supple. No obvious neck masses.  Cardiovascular: Regular rate/rhythm; no murmurs/rubs/gallops appreciated.  Respiratory: Lungs clear to auscultation bilaterally. Good aeration. No wheezing/rales/rhonchi.   Skin: No rash, lesions, or petechiae of exposed skin.   Extremities: Peripheral pulses intact. No clubbing, cyanosis, or edema.  Psychiatric/mental status: Alert, without lethargy or stupor. Speech fluent. Appropriate affect. Mood normal. Able to follow commands without difficulty. Tearful throughout exam.    Musculoskeletal exam:  Able to walk on the heels and toes with significant difficulty. Patient has antalgic gait favoring the neither side.   Normal bulk and tone. Unremarkable spinal curvature.     Cervical spine:  Range of motion within normal limits.  Tenderness in the cervical paraspinal muscles.No  Spurling's negative bilaterally.     Thoracic spine:    Kyphosis. No   Tenderness in the thoracic paraspinal muscles.No    Lumbar spine:    Flex:  80 degrees   Ext: 10 degrees   Tenderness in the lumbar paraspinal muscles.Yes   Rotation/ext to right: painful    Rotation/ext to left: painful     Myofascial tenderness:  lumbar paraspinals  Focal tenderness: Bilateral SI joint tenderness. No gluteal, piriformis, or GT tenderness.  Straight leg raise: negative   FADIR: negative     Hip exam:   Normal internal and  external range of motion bilaterally. TERESSA negative.     Neurologic exam:  CN:  Cranial nerves 2-12 are grossly intact  Motor:  5/5 UE and LE strength  Strength:       C4 (shoulder shrug)  symmetric 5/5       C5 (shoulder abduction) symmetric 5/5       C6 (elbow flexion) symmetric 5/5       C7 (elbow extension) symmetric 5/5       C8 (finger abduction, thumb flexion) symmetric 5/5    Reflexes:     Biceps:     R:  2/4 L: 2/4   Brachioradialis   R:  2/4 L: 2/4   Patella:  R:  1/4 L: 1/4   Achilles:  R:  1/4 L: 1/4  Other reflexes:    No ankle clonus     Sensory:   Light touch: normal bilateral upper and lower extremities    Vibration: normal in LE   No allodynia, dysesthesia. Hyperalgesia.    DIRE Score for ongoing opioid management is calculated as follows:   Diagnosis = 2 pts (slowly progressive; moderate pain/objective findings)   Intractability = 2 pts (most treatments tried; patient not fully engaged/barriers)   Risk    Psych = 2 pts (personality dysfunction/mental illness that moderately interferes with care)    Chem Hlth = 3 pts (no history of chemical dependency; not drug-focused)   Reliability = 2 pts (occasional difficulties with compliance; generally reliable)   Social = 3 pts (supportive family/close relationships; involved in work/school; no isolation)   (Psych + Chem hlth + Reliability + Social) = 14     Efficacy = 2 pts (moderate benefit/function; low med dose; too early/not tried meds)         DIRE Score = 16        7-13: likely NOT suitable candidate for long-term opioid analgesia       14-21: may be a suitable candidate for long-term opioid analgesia     Assessment:  Alexandru Trujillo is a 32 year old female with a past medical history significant for dysmenorrhea and cholelithiasis who presents with complaints of low back pain .     1. Low back pain- etiology unclear, likely mild facet degenerative changes L4-5 and L5-S1, mild degenerative disc disease with mild disc bulge L4-5, and mild disc bulge  lateralizing slightly to left L5-S1.    2. Mental Health - the patient's mental health concerns, specifically situational depression, affect her experience of pain and contribute to her clinically significant distress.    1. Chronic right-sided low back pain with right-sided sciatica    2. Muscle spasm    3. Chronic pain syndrome        Plan:  The following recommendations were given to the patient. Diagnosis, treatment options, risks, benefits, and alternatives were discussed, and all questions were answered. The patient expressed understanding of the plan for management.     I am recommending a multidisciplinary treatment plan to help this patient better manage her pain.  This includes:      1.  Pain Physical Therapy:  YES   Schedule first visit with OLIVIA Cooper.    2.  Pain Psychologist to address relaxation, behavioral change, coping style, and other factors important to improvement.  YES  Schedule first visit with Dr. Kay or Edgar Mendenhall with [facility].   3.  Medication Management:     1. Alexandru states Flexeril is not very helpful for muscle spasm, is very sedation. She is interested in Robaxin. Stop Flexeril. Prescription for Robaxin today, okay to take one to two tablets three times daily as needed for muscle spams. We discussed this should be less sedating.     2. We discussed gabapentin and amitriptyline as options to start next. She is interested in both but would like to start with gabapentin. Start gabapentin as directed below. Call with issues.  Gabapentin 1 tab= 300mg  AM   PM   Bedtime  0   0   300mg (1 tab).  After 3-5 days, increase as tolerated   300mg (1 tab)  0   300mg (1 tab).  After 3-5 days, increase as tolerated   300mg (1 tab)  300mg (1 tab)  300mg (1 tab).  After 3-5 days, increase as tolerated   300mg (1 tab)  300mg (1 tab)  600mg (2 tabs). After 3-5 days, increase as tolerated   600mg (2 tabs)  300mg (1 tab)  600mg (2 tabs). After 3-5 days, increase as tolerated    600mg (2 tabs)  600mg (2 tabs)  600mg (2 tabs).     Call with any problems.  Caution for sedation.    Do not drive until you know how the medication affects you.   You can go slower if you need to or increasing only one dose at a time.  Do not stop abruptly once at higher doses.  This medication must be tapered off.   4.  Potential procedures: could discuss in the future.     5.  Follow up with DEBORAH Ro CNP in 4 weeks.     Review of Electronic Chart: Today I have also reviewed available medical information in the patient's medical record at [facility] including relevant provider notes, laboratory work, and imaging.       I spent 60 minutes of time face to face with the patient.  Greater than 50% of this time was spent in patient counseling and/or coordination of care regarding principles of multidisciplinary care, medication management, and treatment options as discussed above.      DEBORAH Ro CNP  [facility]

## 2018-05-02 ENCOUNTER — OFFICE VISIT (OUTPATIENT)
Dept: PALLIATIVE MEDICINE | Facility: CLINIC | Age: 33
End: 2018-05-02
Payer: COMMERCIAL

## 2018-05-02 VITALS
HEART RATE: 56 BPM | WEIGHT: 205 LBS | OXYGEN SATURATION: 98 % | BODY MASS INDEX: 35.74 KG/M2 | SYSTOLIC BLOOD PRESSURE: 108 MMHG | DIASTOLIC BLOOD PRESSURE: 72 MMHG

## 2018-05-02 DIAGNOSIS — M62.838 MUSCLE SPASM: ICD-10-CM

## 2018-05-02 DIAGNOSIS — G89.29 CHRONIC RIGHT-SIDED LOW BACK PAIN WITH RIGHT-SIDED SCIATICA: Primary | ICD-10-CM

## 2018-05-02 DIAGNOSIS — M54.41 CHRONIC RIGHT-SIDED LOW BACK PAIN WITH RIGHT-SIDED SCIATICA: Primary | ICD-10-CM

## 2018-05-02 DIAGNOSIS — G89.4 CHRONIC PAIN SYNDROME: ICD-10-CM

## 2018-05-02 PROCEDURE — 99244 OFF/OP CNSLTJ NEW/EST MOD 40: CPT | Performed by: NURSE PRACTITIONER

## 2018-05-02 RX ORDER — GABAPENTIN 300 MG/1
600 CAPSULE ORAL 3 TIMES DAILY
Qty: 180 CAPSULE | Refills: 3 | Status: SHIPPED | OUTPATIENT
Start: 2018-05-02

## 2018-05-02 RX ORDER — METHOCARBAMOL 500 MG/1
500-1000 TABLET, FILM COATED ORAL 4 TIMES DAILY PRN
Qty: 60 TABLET | Refills: 0 | Status: SHIPPED | OUTPATIENT
Start: 2018-05-02 | End: 2018-05-30

## 2018-05-02 ASSESSMENT — PAIN SCALES - GENERAL: PAINLEVEL: EXTREME PAIN (8)

## 2018-05-02 NOTE — PATIENT INSTRUCTIONS
Diagnosis reviewed, treatment option addressed, and risk/benifits discussed.  Self-care instructions given.  I am recommending a multidisciplinary treatment plan to help this patient better manage her pain.       1.  Pain Physical Therapy:  YES   Schedule first visit with MATT Cooper    2.  Pain Psychologist to address relaxation, behavioral change, coping style, and other factors important to improvement.  YES  Schedule first visit with Dr. Kay or Edgar Mendenhall with [facility] [phone].   3.  Medication Management:     1. Stop Flexeril. Prescription for Robaxin, okay to take one to tablets three times daily as needed for muscle spams. This should be less sedating.     2. Start gabapentin as directed below. Call with issues.  Gabapentin 1 tab= 300mg  AM   PM   Bedtime  0   0   300mg (1 tab).  After 3-5 days, increase as tolerated   300mg (1 tab)  0   300mg (1 tab).  After 3-5 days, increase as tolerated   300mg (1 tab)  300mg (1 tab)  300mg (1 tab).  After 3-5 days, increase as tolerated   300mg (1 tab)  300mg (1 tab)  600mg (2 tabs). After 3-5 days, increase as tolerated   600mg (2 tabs)  300mg (1 tab)  600mg (2 tabs). After 3-5 days, increase as tolerated   600mg (2 tabs)  600mg (2 tabs)  600mg (2 tabs).     Call with any problems.  Caution for sedation.    Do not drive until you know how the medication affects you.   You can go slower if you need to or increasing only one dose at a time.  Do not stop abruptly once at higher doses.  This medication must be tapered off.   4.  Potential procedures: could discuss in the future.     5.  Follow up with DEBORAH Ro CNP in 4 weeks.       ----------------------------------------------------------------  Nurse Triage line:  [phone]   Call this number with any questions or concerns. You may leave a detailed message anytime. Calls are typically returned Monday through Friday between 8 AM and 4:30 PM. We usually get back to you within 2  business days depending on the issue/request.       Medication refills:    For non-narcotic medications, call your pharmacy directly to request a refill. The pharmacy will contact the [facility] for authorization. Please allow 3-4 days for these refills to be processed.     For narcotic refills, call the nurse triage line or send a [employer] message. Please contact us 7-10 days before your refill is due. The message MUST include the name of the specific medication(s) requested and how you would like to receive the prescription(s). The options are as follows:    Pain Clinic staff can mail the prescription to your pharmacy. Please tell us the name of the pharmacy.    You may pick the prescription up at the Pain Clinic (tell us the location) or during a clinic visit with your pain provider    Pain Clinic staff can deliver the prescription to the [facility] pharmacy in the clinic building. Please tell us the location.      Scheduling number: [phone].  Call this number to schedule or change appointments.    We believe regular attendance is key to your success in our program.    Any time you are unable to keep your appointment we ask that you call us at least 24 hours in advance to let us know. This will allow us to offer the appointment time to another patient.

## 2018-05-02 NOTE — NURSING NOTE
"Chief Complaint   Patient presents with     Pain       Initial /72  Pulse 56  Wt 93 kg (205 lb)  LMP 10/26/2016  SpO2 98%  BMI 35.74 kg/m2 Estimated body mass index is 35.74 kg/(m^2) as calculated from the following:    Height as of 4/2/18: 1.613 m (5' 3.5\").    Weight as of this encounter: 93 kg (205 lb).        Kenyatta LIMON LPN  [facility] [place]     "

## 2018-05-02 NOTE — MR AVS SNAPSHOT
After Visit Summary   5/2/2018    Alexandru Trujillo    MRN: [id number]           Patient Information     Date Of Birth          1985        Visit Information        Provider Department      5/2/2018 10:00 AM Mayte Jiang APRN [facility]        Today's Diagnoses     Chronic right-sided low back pain with right-sided sciatica    -  1    Chronic pain syndrome        Muscle spasm          Care Instructions    Diagnosis reviewed, treatment option addressed, and risk/benifits discussed.  Self-care instructions given.  I am recommending a multidisciplinary treatment plan to help this patient better manage her pain.       1.  Pain Physical Therapy:  YES   Schedule first visit with OLIVIA Cooper.    2.  Pain Psychologist to address relaxation, behavioral change, coping style, and other factors important to improvement.  YES  Schedule first visit with Dr. Kay or Edgar Mendenhall with [facility] [phone].   3.  Medication Management:     1. Stop Flexeril. Prescription for Robaxin, okay to take one to tablets three times daily as needed for muscle spams. This should be less sedating.     2. Start gabapentin as directed below. Call with issues.  Gabapentin 1 tab= 300mg  AM   PM   Bedtime  0   0   300mg (1 tab).  After 3-5 days, increase as tolerated   300mg (1 tab)  0   300mg (1 tab).  After 3-5 days, increase as tolerated   300mg (1 tab)  300mg (1 tab)  300mg (1 tab).  After 3-5 days, increase as tolerated   300mg (1 tab)  300mg (1 tab)  600mg (2 tabs). After 3-5 days, increase as tolerated   600mg (2 tabs)  300mg (1 tab)  600mg (2 tabs). After 3-5 days, increase as tolerated   600mg (2 tabs)  600mg (2 tabs)  600mg (2 tabs).     Call with any problems.  Caution for sedation.    Do not drive until you know how the medication affects you.   You can go slower if you need to or increasing only one dose at a time.  Do not stop abruptly once at higher doses.  This  medication must be tapered off.   4.  Potential procedures: could discuss in the future.     5.  Follow up with DEBORAH Ro CNP in 4 weeks.       ----------------------------------------------------------------  Nurse Triage line:  [phone]   Call this number with any questions or concerns. You may leave a detailed message anytime. Calls are typically returned Monday through Friday between 8 AM and 4:30 PM. We usually get back to you within 2 business days depending on the issue/request.       Medication refills:    For non-narcotic medications, call your pharmacy directly to request a refill. The pharmacy will contact the [facility] for authorization. Please allow 3-4 days for these refills to be processed.     For narcotic refills, call the nurse triage line or send a [employer] message. Please contact us 7-10 days before your refill is due. The message MUST include the name of the specific medication(s) requested and how you would like to receive the prescription(s). The options are as follows:    Pain Clinic staff can mail the prescription to your pharmacy. Please tell us the name of the pharmacy.    You may pick the prescription up at the Pain Clinic (tell us the location) or during a clinic visit with your pain provider    Pain Clinic staff can deliver the prescription to the [facility] in the clinic building. Please tell us the location.      Scheduling number: [phone].  Call this number to schedule or change appointments.    We believe regular attendance is key to your success in our program.    Any time you are unable to keep your appointment we ask that you call us at least 24 hours in advance to let us know. This will allow us to offer the appointment time to another patient.               Follow-ups after your visit        Additional Services     PAIN PHD EVAL/TREAT/FOLLOW UP           PAIN PT EVAL AND TREAT                 Who to contact     If you have questions or need  "follow up information about today's clinic visit or your schedule please contact [facility] directly at [phone].  Normal or non-critical lab and imaging results will be communicated to you by Kyronhart, letter or phone within 4 business days after the clinic has received the results. If you do not hear from us within 7 days, please contact the clinic through Kyron[employer] or phone. If you have a critical or abnormal lab result, we will notify you by phone as soon as possible.  Submit refill requests through iZettle or call your pharmacy and they will forward the refill request to us. Please allow 3 business days for your refill to be completed.          Additional Information About Your Visit        KyronharIntelliBatt Information     iZettle lets you send messages to your doctor, view your test results, renew your prescriptions, schedule appointments and more. To sign up, go to [email or web address] . Click on \"Log in\" on the left side of the screen, which will take you to the Welcome page. Then click on \"Sign up Now\" on the right side of the page.     You will be asked to enter the access code listed below, as well as some personal information. Please follow the directions to create your username and password.     Your access code is: [email or web address]  Expires: 2018 11:09 PM     Your access code will  in 90 days. If you need help or a new code, please call your [facility] clinic or [phone].        Care EveryWhere ID     This is your Care EveryWhere ID. This could be used by other organizations to access your [facility] medical records  [id number]        Your Vitals Were     Pulse Last Period Pulse Oximetry BMI (Body Mass Index)          56 10/26/2016 98% 35.74 kg/m2         Blood Pressure from Last 3 Encounters:   18 108/72   18 100/60   18 130/79    Weight from Last 3 Encounters:   18 93 kg (205 lb)   18 93.3 kg (205 lb 9.6 oz)   18 91 kg (200 lb 9.9 oz)            "   We Performed the Following     PAIN PHD EVAL/TREAT/FOLLOW UP     PAIN PT EVAL AND TREAT          Today's Medication Changes          These changes are accurate as of 5/2/18 10:38 AM.  If you have any questions, ask your nurse or doctor.               Start taking these medicines.        Dose/Directions    gabapentin 300 MG capsule   Commonly known as:  NEURONTIN   Used for:  Chronic right-sided low back pain with right-sided sciatica, Chronic pain syndrome, Muscle spasm   Started by:  Mayte Jiang APRN CNP        Dose:  600 mg   Take 2 capsules (600 mg) by mouth 3 times daily   Quantity:  180 capsule   Refills:  3       methocarbamol 500 MG tablet   Commonly known as:  ROBAXIN   Used for:  Muscle spasm   Started by:  Mayte Jiang APRN CNP        Dose:  500-1000 mg   Take 1-2 tablets (500-1,000 mg) by mouth 4 times daily as needed for muscle spasms   Quantity:  60 tablet   Refills:  0            Where to get your medicines      These medications were sent to [facility] [address] [place] [address] [address] [facility] [place] [address] [facility] [address]     Phone:  [phone]     gabapentin 300 MG capsule    methocarbamol 500 MG tablet                Primary Care Provider Office Phone # Fax #    Eveline Olivares [phone]       [address] [place] [address]        Equal Access to Services     [facility]ESS[facility] AH: Hadii rehan chen hadasho Soyandel, waaxda luqadaha, qaybta kaalmada adelauritayaari, antoinette nayak . So [facility] [phone].    ATENCIÓN: Si habla español, tiene a sears disposición servicios gratuitos de asistencia lingüística. Addy al [phone].    We comply with applicable federal civil rights laws and [place] laws. We do not discriminate on the basis of race, color, national origin, age, disability, sex, sexual orientation, or gender identity.            Thank you!     Thank you for choosing [facility]  for your care. Our goal is always to provide you with excellent care. Hearing back from our patients is one way we can continue to improve our services. Please take a few minutes to complete the written survey that you may receive in the mail after your visit with us. Thank you!             Your Updated Medication List - Protect others around you: Learn how to safely use, store and throw away your medicines at www.disposemymeds.org.          This list is accurate as of 5/2/18 10:38 AM.  Always use your most recent med list.                   Brand Name Dispense Instructions for use Diagnosis    acetaminophen 500 MG Caps     30 capsule    Take 2 capsules by mouth every 8 hours as needed For aches, pain, fever        gabapentin 300 MG capsule    NEURONTIN    180 capsule    Take 2 capsules (600 mg) by mouth 3 times daily    Chronic right-sided low back pain with right-sided sciatica, Chronic pain syndrome, Muscle spasm       ibuprofen 600 MG tablet    ADVIL/MOTRIN    30 tablet    Take 1 tablet (600 mg) by mouth every 8 hours as needed for moderate pain        methocarbamol 500 MG tablet    ROBAXIN    60 tablet    Take 1-2 tablets (500-1,000 mg) by mouth 4 times daily as needed for muscle spasms    Muscle spasm       ondansetron 4 MG tablet    ZOFRAN    18 tablet    Take 1 tablet (4 mg) by mouth every 8 hours as needed for nausea

## 2018-05-02 NOTE — LETTER
[facility]  [address] [address] [address]  [phone]          May 2, 2018    Alexandru Trujillo                                                                                                                     [address] [place] [id number]            To whomever it may concern,    Alexandru was seen at the pain management clinic today for an office visit.     Sincerely,         Mayte CABRERA CNP

## 2018-05-07 ENCOUNTER — TELEPHONE (OUTPATIENT)
Dept: INTERNAL MEDICINE | Facility: CLINIC | Age: 33
End: 2018-05-07

## 2018-05-07 ENCOUNTER — OFFICE VISIT (OUTPATIENT)
Dept: PALLIATIVE MEDICINE | Facility: CLINIC | Age: 33
End: 2018-05-07
Payer: COMMERCIAL

## 2018-05-07 DIAGNOSIS — G89.29 CHRONIC RIGHT-SIDED LOW BACK PAIN WITH RIGHT-SIDED SCIATICA: Primary | ICD-10-CM

## 2018-05-07 DIAGNOSIS — M54.41 CHRONIC RIGHT-SIDED LOW BACK PAIN WITH RIGHT-SIDED SCIATICA: Primary | ICD-10-CM

## 2018-05-07 DIAGNOSIS — G89.4 CHRONIC PAIN SYNDROME: ICD-10-CM

## 2018-05-07 PROCEDURE — 97530 THERAPEUTIC ACTIVITIES: CPT | Mod: GP | Performed by: PHYSICAL THERAPIST

## 2018-05-07 PROCEDURE — 97162 PT EVAL MOD COMPLEX 30 MIN: CPT | Mod: GP | Performed by: PHYSICAL THERAPIST

## 2018-05-07 NOTE — LETTER
[facility]  [address] [address] [address]  [phone]          May 7, 2018    Alexandru Trujillo                                                                                                                     [address] [place] [id number]            To Whom It May Concern,     The above patient had an appointment with our clinic today, Monday, May 7th, 2018.          Sincerely,         Allison Qiu PT

## 2018-05-07 NOTE — TELEPHONE ENCOUNTER
Dr. Elise Horvath called back again at 4:20 and I called Alexandru back and left a detailed message (CTC) about this being addressed tomorrow 5/8/18.    Thank you.     JACKIE Hodgson ([facility])

## 2018-05-07 NOTE — TELEPHONE ENCOUNTER
Pt was seen in PT today, having difficulty with the pain. Called clinic in tears as she needs to take a couple of days of to take a medication Gabapentin to see if it will help her with the pain, but it makes her drowsy, sleepy and cannot function at work. Would PCP please write a note excusing pt for a couple days so she and physical therapy and see if this Rx will help pt with her pain or would you like to see pt for further eval and treat?   Fax for note to work: [phone] Attn: Leticia Snow RN

## 2018-05-07 NOTE — TELEPHONE ENCOUNTER
"Talked to patient.    She reports that her physical therapist recommended time off from work to help medications take effects.    Patient then reports that the medications do not help with her pain and that they make her drowsy.    Discussed importance of reporting side effects and efficacy concerns of medications with pain clinic.     Patient declines - just wants a note for work excusing her for a couple days.    Unfortunately, I cannot provide this without medical justification.     Patient then requests a note for work with work restrictions in place - reports that these were put in place by PT. I do not see these.    Patient then reports that she is in severe pain but will \"just ignore it\" so that I can write her a letter with restrictions.    Told patient that I needed to discuss her case with her pain provider and therapist.    Patient became angry and said that I am giving her the run around and just need to write a letter.    Apologized to patient and let her know that I would reach out to her team for further advisement.       "

## 2018-05-07 NOTE — MR AVS SNAPSHOT
"              After Visit Summary   5/7/2018    Alexandru Trujillo    MRN: [id number]           Patient Information     Date Of Birth          1985        Visit Information        Provider Department      5/7/2018 1:00 PM Allison Qiu, OLIVIA [facility]        Today's Diagnoses     Chronic right-sided low back pain with right-sided sciatica    -  1    Chronic pain syndrome           Follow-ups after your visit        Your next 10 appointments already scheduled     May 14, 2018  1:00 PM CDT   Return Visit with Allison Qiu PT   [facility] ([facility])    [address] [facility]  [address] [place] [address]   [phone]            May 30, 2018 10:30 AM CDT   Return Visit with DEBORAH Barrow CNP   [facility] ([facility])    [address] [address] [id number] [phone]              Who to contact     If you have questions or need follow up information about today's clinic visit or your schedule please contact [facility] directly at [phone].  Normal or non-critical lab and imaging results will be communicated to you by ReVolt Automotivehart, letter or phone within 4 business days after the clinic has received the results. If you do not hear from us within 7 days, please contact the clinic through [employer] or phone. If you have a critical or abnormal lab result, we will notify you by phone as soon as possible.  Submit refill requests through [email or web address] or call your pharmacy and they will forward the refill request to us. Please allow 3 business days for your refill to be completed.          Additional Information About Your Visit        MyChart Information     [email or web address] lets you send messages to your doctor, view your test results, renew your prescriptions, schedule appointments and more. To sign up, go to [email or web address] . Click on \"Log in\" on the left side of the screen, " "which will take you to the Welcome page. Then click on \"Sign up Now\" on the right side of the page.     You will be asked to enter the access code listed below, as well as some personal information. Please follow the directions to create your username and password.     Your access code is: [email or web address]  Expires: 2018 11:09 PM     Your access code will  in 90 days. If you need help or a new code, please call your [facility] clinic or [phone].        Care EveryWhere ID     This is your Care EveryWhere ID. This could be used by other organizations to access your [facility] medical records  [id number]        Your Vitals Were     Last Period                   10/26/2016            Blood Pressure from Last 3 Encounters:   18 108/72   18 100/60   18 130/79    Weight from Last 3 Encounters:   18 93 kg (205 lb)   18 93.3 kg (205 lb 9.6 oz)   18 91 kg (200 lb 9.9 oz)              We Performed the Following     HC PT EVAL, MODERATE COMPLEXITY     THERAPEUTIC ACTIVITIES        Primary Care Provider Office Phone # Fax #    Eveline Olivares [phone]       [address] [place] [address]        Equal Access to Services     [facility]ESSICA : Hadii aad ku hadasho Soomaali, waaxda luqadaha, qaybta kaalmada adeegyada, waxay idiin hayaan desean nayak . So [facility] [phone].    ATENCIÓN: Si habla español, tiene a sears disposición servicios gratuitos de asistencia lingüística. Llame al [phone].    We comply with applicable federal civil rights laws and [place] laws. We do not discriminate on the basis of race, color, national origin, age, disability, sex, sexual orientation, or gender identity.            Thank you!     Thank you for choosing [facility]  for your care. Our goal is always to provide you with excellent care. Hearing back from our patients is one way we can continue to improve our services. Please take a few minutes to " complete the written survey that you may receive in the mail after your visit with us. Thank you!             Your Updated Medication List - Protect others around you: Learn how to safely use, store and throw away your medicines at www.disposemymeds.org.          This list is accurate as of 5/7/18  2:55 PM.  Always use your most recent med list.                   Brand Name Dispense Instructions for use Diagnosis    acetaminophen 500 MG Caps     30 capsule    Take 2 capsules by mouth every 8 hours as needed For aches, pain, fever        gabapentin 300 MG capsule    NEURONTIN    180 capsule    Take 2 capsules (600 mg) by mouth 3 times daily    Chronic right-sided low back pain with right-sided sciatica, Chronic pain syndrome, Muscle spasm       ibuprofen 600 MG tablet    ADVIL/MOTRIN    30 tablet    Take 1 tablet (600 mg) by mouth every 8 hours as needed for moderate pain        methocarbamol 500 MG tablet    ROBAXIN    60 tablet    Take 1-2 tablets (500-1,000 mg) by mouth 4 times daily as needed for muscle spasms    Muscle spasm       ondansetron 4 MG tablet    ZOFRAN    18 tablet    Take 1 tablet (4 mg) by mouth every 8 hours as needed for nausea

## 2018-05-07 NOTE — PROGRESS NOTES
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  She was able to self manage back pain well until about 2 years ago when pain worsened.  Pt reports prior history of tubal ligation , tubal pregnancy with miscarriage 2012 and 2012, cholecystectomy 2016 and dysmenorrhea with hysterectomy 2016.  Back pain has been most problematic since last October.     Pattern Since Onset: Worsened    Pain is described as shooting      Frequency: Constant     Intensity: Best 2/10, Worst 10/10;  Current 9/10 ; Average 6/10    Fatigue Level: (scale 0-10)  7/10 average    LEVEL OF FUNCTION AT START OF CARE    Walking tolerance: 2 minutes  Sitting tolerance: 60 minutes  Standing tolerance: 10 minutes  Housework tolerance: 5 minutes  Sleep: barely sleeping 1-2 hours due to pain    Current Aggrevating Activities / Functional Limitations: bending, standing, walking, getting up from siting      CURRENT / PREVIOUS INTERVENTION(S):     Relieving Activities / Self Care: laying down, back brace, lidocaine patches, rice sock    Previous / Current therapies for current chief complaint: PT at [facility] 2018, too painful    Prior Functional Level: No functional limitations prior to onset of chief complaint.       DEMOGRAPHICS  Employment Status:  requires lifting and standing on feet 8 hours    Social Support: lives in apartment with  and 3 children ages 13, 10, 9 years    Home or Community Barriers: difficulty; Stairs: not able    Pertinent Medical  / Surgical History: Epic Snapshot Reviewed,  See provider's note    Patient's goals for physical therapy: reduce pain and pressure in the abdomen, be able to bend, walk, stand, work and do normal activities at home, resume walking and HEP    ===============================================================  OBJECTIVE:  POSTURE:  Observation: using arms to support upper body while seated       GAIT, LOCOMOTION, and BALANCE:  Gait and Locomotion: slow and painful WB on right LE; better with trial of 4WW  Balance: next    RANGE OF MOTION: next; unable to assess due to severe pain and reactivity with all movements    MUSCLE PERFORMANCE:   Strength: demonstrates core instability; next      Flexibility: tightness noted in next    FUNCTIONAL TESTING/OBSERVATION: Pt tearful and demonstrating difficulty with abdominal breathing due to intense pain and stress related to ongoing pain.  Difficulty with transitional sit to stand movements. Bed mobility not tested today due pain limiting movement.    Pain behaviors: None    ===============================================================  Today's Treatment:  Initial evaluation  Therapeutic Activity:   For 30 minutes including Pt educated on the concept of the nervous system as a hypersensitive and hyperactive alarm system and the role of physical therapy in desensitizing the nerves and reducing pain.  Issued stress management/relaxation CD.  Discussed importance of pacing activities at home and at work to help calm nervous system pain response.  Focus next session: self cares, monitor breathing    ===============================================================  ASSESSMENT:  Physical Therapy Diagnosis:Impaired Posture and Impaired Muscle Performance    Patient requires PT intervention for the following impairments: Limited knowledge of condition and / or self care - inability to control symptoms, Impaired functional mobility, Impaired gait / weight bearing tolerance, Pain, ROM limitations and Deconditioning    Anticipated Goals  and Expected Outcomes:  8 weeks  Patient will report the use of 2 self care practices during their day.  Patient will report the participation in 20 minutes of aerobic activity daily and practicing stretching daily.  Patient will demonstrate the ability to find core strength in neutral posture.  Patient will demonstrate the ability to relax muscle group before stretching.  16 weeks  Patient will be independent with a home exercise program.  Patient will be independent with posture correction.  Patient will report independence with a self care/flare management program.  Patient will demonstrate improved functional strength and endurance as reports by increased tolerance for IADLs and more consistent participation in daily activity.     Rehab potential for achieving goals: good.    ===============================================================  PLAN:   Patient will benefit from skilled physical therapy consisting of:  neuromuscular reeducation of: kinesthetic sense and posture for sitting and/or standing activities, education in self care / home management training to include instruction in: symptom control techniques, therapeutic activities to achieve improved functional performance in: daily actvities and therapeutic exercise to develop: strength and endurance, flexibility and core stability    Assessment will be ongoing with changes in treatment as indicated.  Benefits/risks/alternatives to treatment have been reviewed and the patient has been instructed to contact this office if they have any questions or concerns.  This plan of care has been discussed with the patient and the patient is in agreement.     Frequency / Duration:  Patient will be seen for a total of 13 visits; 16 weeks    Total Visit Time: 45  minutes            Allison Qiu, PT                                      Date:  5/7/2018      =====================================================  **  Referring Provider Certification: Referring Provider  reviewing certifies that the above treatment / plan of care is required and authorized, and that the patient's plan will be reviewed every thirty (30) days **.   ======================================================     PRESENT:  NA    MULTIDISCIPLINARY PATIENT / FAMILY EDUCATION RECORD  Department:  Physical Therapy    Readiness to Learn: Ability to understand verbal instructions, Ability to understand written instructions, Knowledge of educational needs / treatment plan  Specific Barriers to Learning: None  Referrals: None  Learning Needs: Rehabilitation techniques to improve functional independence Pain management education to improve daily activity tolerance.  Who: Patient  How: Demonstration, Verbal instructions, Written instructions  Response: Appropriate verbal response, Asked questions, Demonstrated ability, Verbalized recall / understanding

## 2018-05-08 ENCOUNTER — TELEPHONE (OUTPATIENT)
Dept: PALLIATIVE MEDICINE | Facility: CLINIC | Age: 33
End: 2018-05-08

## 2018-05-08 NOTE — TELEPHONE ENCOUNTER
Upon review of the PT note there is no recommendations re: work restrictions.     Unfortunately, primary care physicians do not assess for workability/physical restrictions - that is more the domain of physical therapy.    Unable to provide note for work.    Would recommend that patient contact pain clinic if better pain management needed.

## 2018-05-08 NOTE — TELEPHONE ENCOUNTER
PA request and supporting documents (PA request, OV notes, PT notes) faxed to [facility] for prior authorization for physical therapy. Rightfax confirmed.    Marilee Resendiz    [place] [facility]

## 2018-05-08 NOTE — TELEPHONE ENCOUNTER
I was made aware that Alexandru was struggling in physical therapy yesterday (despite pain PT being a relatively gentle form of movement). Unfortunately, as we are a chronic pain clinic, it takes time for some of our interventions to take effect. If her pain truly is so severe, she should be seen in the ED for evaluation. I was not aware that she was having side effects with gabapentin, that is something we could discuss further at an office visit. In regards to workability, I am not able to provide restrictions or time off. If that is something she is really in need of, I would recommend she come in for a primary care office visit for determination of workability. Please let me know if you have further questions.     DEBORAH Ro CNP  [facility]

## 2018-05-14 ENCOUNTER — OFFICE VISIT (OUTPATIENT)
Dept: PALLIATIVE MEDICINE | Facility: CLINIC | Age: 33
End: 2018-05-14
Payer: COMMERCIAL

## 2018-05-14 DIAGNOSIS — G89.4 CHRONIC PAIN SYNDROME: ICD-10-CM

## 2018-05-14 DIAGNOSIS — G89.29 CHRONIC RIGHT-SIDED LOW BACK PAIN WITH RIGHT-SIDED SCIATICA: Primary | ICD-10-CM

## 2018-05-14 DIAGNOSIS — M54.41 CHRONIC RIGHT-SIDED LOW BACK PAIN WITH RIGHT-SIDED SCIATICA: Primary | ICD-10-CM

## 2018-05-14 PROCEDURE — 97530 THERAPEUTIC ACTIVITIES: CPT | Mod: GP | Performed by: PHYSICAL THERAPIST

## 2018-05-14 NOTE — PROGRESS NOTES
PAIN PHYSICAL THERAPY PROGRESS NOTE  Patient Name: Alexandru Trujillo      YOB: 1985     Medical Record Number: [id number]  Diagnosis:    Chronic right-sided low back pain with right-sided sciatica  Chronic pain syndrome    Visit: 2/13    Subjective: Patient reports back pain feeling better this past week because able to take time off from work and rest.  Taking muscle relaxer and gabapentin medication as prescribed.  Still feeling very tired with gabapentin.  Encouraged patient to contact DEBORAH Knott CNP regarding medication side effects.  Pain mostly in buttocks bilaterally at 3/10 pain level.  Notes episodes of upper abdominal/lower rib cage tightness and difficulty with breathing.  Breathing/relaxation CD very beneficial.    Self Care  HEP: next  Walking/Aerobic Activity: frequent short walks as tolerated  Posture: neutral spine in standing, side lying and supine  Breathing/Relaxation: indep with CD  Heat/Ice: next  Mini Breaks: begin instruction   Pacing: begin instruction       Objective Findings:  OBSERVATION: Pt appears more rested and much less distressed today. Independent chair and bed mobility without increased pain response.  GAIT & LOCOMOTION: upright posture improved, slow with mild antalgic WB on right LE  RANGE OF MOTION: Trunk AROM is limited to 50% lateral bending bilaterally; extension WFL; flexion-not tested  PALPATION: Hypersensitivity with touch at T-L junction and noted mild accentuated extension; pain pattern includes guarding at T-L junction with impaired diaphragmatic breathing.  SPECIAL TESTS: crossed pattern increased right LBP with seated left LE slump sit test.  Instructed patient in supported standing trunk SB with arms at side reaching to knees; reinforced breathing awareness and monitor elevated shoulders  Instructed in pillow support for neutral skeletal alignment in supine and side lying.  Pt noted significant tension/pain relief and unrestricted  breathing pattern with supported postures.    Reviewed log rolling bed mobility with transitioning from supine to sitting.      Treatment Interventions:  Therapeutic Activity:   For 45 minutes including instruction in breathing, body awareness, postural support and body mechanics bed mobility  __________________________________________________________________  Instruction on home program: SB    Assessment:  Ongoing Functional Limitations Include:  Patient tolerated/responded well to treatment    Recommendations: follow up with pain psychology    Intensity Level: 3 (1=low intensity; 5=high intensity)  Demonstrates/Verbalizes Technique: 5 (1= poor technique-difficulty performing exercises,significant cues required; 5= good technique-performs exercises without cues)  Body Awareness: 4 (1=low awareness; 5=high awareness)  Posture/Stability: 3 (1= poor posture, stability; 5= good posture, stability)  Motivational Level: Ask questions, Eager to learn and Cooperative  Response to Teaching: cooperative  Factors that affect learning: None    _______________________________________________________________________  Plan of Care  Continue PT to support reactivation and integration of self regulation pain management skills;  Continue with prescribed plan of care - progress as tolerated.  Focus next session will be on: add walking, mini breaks and pacing; consider SL shoulder/hip glides, progress HEP as able     Present:  EVERTON     Total Visit Time:  45 minutes    Therapist: Allison Qiu PT             Date: 5/14/2018

## 2018-05-14 NOTE — MR AVS SNAPSHOT
"              After Visit Summary   5/14/2018    Alexandru Trujillo    MRN: [id number]           Patient Information     Date Of Birth          1985        Visit Information        Provider Department      5/14/2018 1:00 PM Allison Qiu, OLIVIA [facility]        Today's Diagnoses     Chronic right-sided low back pain with right-sided sciatica    -  1    Chronic pain syndrome           Follow-ups after your visit        Your next 10 appointments already scheduled     May 21, 2018 11:45 AM CDT   Return Visit with Allison Qiu PT   [facility] ([facility])    [address] [facility]  [address] [place] [address]   [phone]            May 30, 2018 10:30 AM CDT   Return Visit with DEBORAH Barrow CNP   [facility] ([facility])    [address] [address] [id number] [phone]              Who to contact     If you have questions or need follow up information about today's clinic visit or your schedule please contact [facility] directly at [phone].  Normal or non-critical lab and imaging results will be communicated to you by Cartesianhart, letter or phone within 4 business days after the clinic has received the results. If you do not hear from us within 7 days, please contact the clinic through [employer] or phone. If you have a critical or abnormal lab result, we will notify you by phone as soon as possible.  Submit refill requests through [employer] or call your pharmacy and they will forward the refill request to us. Please allow 3 business days for your refill to be completed.          Additional Information About Your Visit        MyChart Information     Autos[employer]ite lets you send messages to your doctor, view your test results, renew your prescriptions, schedule appointments and more. To sign up, go to [email or web address] . Click on \"Log in\" on the left side of the " "screen, which will take you to the Welcome page. Then click on \"Sign up Now\" on the right side of the page.     You will be asked to enter the access code listed below, as well as some personal information. Please follow the directions to create your username and password.     Your access code is: [email or web address]  Expires: 2018 11:09 PM     Your access code will  in 90 days. If you need help or a new code, please call your [facility] clinic or [phone].        Care EveryWhere ID     This is your Care EveryWhere ID. This could be used by other organizations to access your [facility] medical records  [id number]        Your Vitals Were     Last Period                   10/26/2016            Blood Pressure from Last 3 Encounters:   18 108/72   18 100/60   18 130/79    Weight from Last 3 Encounters:   18 93 kg (205 lb)   18 93.3 kg (205 lb 9.6 oz)   18 91 kg (200 lb 9.9 oz)              We Performed the Following     THERAPEUTIC ACTIVITIES        Primary Care Provider Office Phone # Fax #    Eveline Olivares -[phone]       [address] [facility] [address]        Equal Access to Services     TD PAR[place] : Hadii aad ku hadasho Soomaali, waaxda luqadaha, qaybta kaalmada adeegyada, waxay idiin hayhin desean nayak . So [place] [phone].    ATENCIÓN: Si habla español, tiene a sears disposición servicios gratuitos de asistencia lingüística. Llame al [phone].    We comply with applicable federal civil rights laws and [place] laws. We do not discriminate on the basis of race, color, national origin, age, disability, sex, sexual orientation, or gender identity.            Thank you!     Thank you for choosing [facility]  for your care. Our goal is always to provide you with excellent care. Hearing back from our patients is one way we can continue to improve our services. Please take a few minutes to complete the written survey that " you may receive in the mail after your visit with us. Thank you!             Your Updated Medication List - Protect others around you: Learn how to safely use, store and throw away your medicines at www.disposemymeds.org.          This list is accurate as of 5/14/18  2:25 PM.  Always use your most recent med list.                   Brand Name Dispense Instructions for use Diagnosis    acetaminophen 500 MG Caps     30 capsule    Take 2 capsules by mouth every 8 hours as needed For aches, pain, fever        gabapentin 300 MG capsule    NEURONTIN    180 capsule    Take 2 capsules (600 mg) by mouth 3 times daily    Chronic right-sided low back pain with right-sided sciatica, Chronic pain syndrome, Muscle spasm       ibuprofen 600 MG tablet    ADVIL/MOTRIN    30 tablet    Take 1 tablet (600 mg) by mouth every 8 hours as needed for moderate pain        methocarbamol 500 MG tablet    ROBAXIN    60 tablet    Take 1-2 tablets (500-1,000 mg) by mouth 4 times daily as needed for muscle spasms    Muscle spasm       ondansetron 4 MG tablet    ZOFRAN    18 tablet    Take 1 tablet (4 mg) by mouth every 8 hours as needed for nausea

## 2018-05-14 NOTE — LETTER
[facility]  [address] [address] [address]  [phone]          May 14, 2018    Re:  Alexandru Trujillo                                                                                                [address] [address] [place] [id number]      To Whom it may concern:     The above patient had an appointment in our clinic today.       Sincerely,         Allison Qiu, PT

## 2018-05-15 NOTE — TELEPHONE ENCOUNTER
Additional documentation requested from and faxed back to [facility]. Right fax confirmed.      Marilee Resendiz    [facility]

## 2018-05-21 ENCOUNTER — OFFICE VISIT (OUTPATIENT)
Dept: PALLIATIVE MEDICINE | Facility: CLINIC | Age: 33
End: 2018-05-21
Payer: COMMERCIAL

## 2018-05-21 DIAGNOSIS — M54.41 CHRONIC RIGHT-SIDED LOW BACK PAIN WITH RIGHT-SIDED SCIATICA: Primary | ICD-10-CM

## 2018-05-21 DIAGNOSIS — G89.29 CHRONIC RIGHT-SIDED LOW BACK PAIN WITH RIGHT-SIDED SCIATICA: Primary | ICD-10-CM

## 2018-05-21 DIAGNOSIS — G89.4 CHRONIC PAIN SYNDROME: ICD-10-CM

## 2018-05-21 PROCEDURE — 97112 NEUROMUSCULAR REEDUCATION: CPT | Mod: GP | Performed by: PHYSICAL THERAPIST

## 2018-05-21 NOTE — MR AVS SNAPSHOT
"              After Visit Summary   5/21/2018    Alexandru Trujillo    MRN: [id number]           Patient Information     Date Of Birth          1985        Visit Information        Provider Department      5/21/2018 11:45 AM Allison Qiu PT [facility]        Today's Diagnoses     Chronic right-sided low back pain with right-sided sciatica    -  1    Chronic pain syndrome           Follow-ups after your visit        Your next 10 appointments already scheduled     May 30, 2018 10:30 AM CDT   Return Visit with DEBORAH Barrow CNP   [facility] ([facility])    [address] [place] [address]   [phone]            May 30, 2018 12:30 PM CDT   Return Visit with Allison Qiu PT   [facility] ([facility])    [address] [address] [address] [phone]              Who to contact     If you have questions or need follow up information about today's clinic visit or your schedule please contact [facility] directly at [phone].  Normal or non-critical lab and imaging results will be communicated to you by Private.Mehart, letter or phone within 4 business days after the clinic has received the results. If you do not hear from us within 7 days, please contact the clinic through [employer] or phone. If you have a critical or abnormal lab result, we will notify you by phone as soon as possible.  Submit refill requests through [employer] or call your pharmacy and they will forward the refill request to us. Please allow 3 business days for your refill to be completed.          Additional Information About Your Visit        Private.Mehart Information     [employer] lets you send messages to your doctor, view your test results, renew your prescriptions, schedule appointments and more. To sign up, go to [email or web address] . Click on \"Log in\" on the left side of the " "screen, which will take you to the Welcome page. Then click on \"Sign up Now\" on the right side of the page.     You will be asked to enter the access code listed below, as well as some personal information. Please follow the directions to create your username and password.     Your access code is: [email or web address]  Expires: 2018 11:09 PM     Your access code will  in 90 days. If you need help or a new code, please call your [place] clinic or [phone].        Care EveryWhere ID     This is your Care EveryWhere ID. This could be used by other organizations to access your [place] medical records  [id number]        Your Vitals Were     Last Period                   10/26/2016            Blood Pressure from Last 3 Encounters:   18 108/72   18 100/60   18 130/79    Weight from Last 3 Encounters:   18 93 kg (205 lb)   18 93.3 kg (205 lb 9.6 oz)   18 91 kg (200 lb 9.9 oz)              We Performed the Following     NEUROMUSCULAR RE-EDUCATION        Primary Care Provider Office Phone # Fax #    Eveline Olivares -[phone]       [address] [place] [address]        Equal Access to Services     TD PAR[place] AH: Hadii aad ku hadasho Soomaali, waaxda luqadaha, qaybta kaalmada adeegyada, waxay idiin hayaan desean nayak . So [facility] [phone].    ATENCIÓN: Si habla español, tiene a sears disposición servicios gratuitos de asistencia lingüística. Llame al [phone].    We comply with applicable federal civil rights laws and [place] laws. We do not discriminate on the basis of race, color, national origin, age, disability, sex, sexual orientation, or gender identity.            Thank you!     Thank you for choosing [facility]  for your care. Our goal is always to provide you with excellent care. Hearing back from our patients is one way we can continue to improve our services. Please take a few minutes to complete the written survey " that you may receive in the mail after your visit with us. Thank you!             Your Updated Medication List - Protect others around you: Learn how to safely use, store and throw away your medicines at www.disposemymeds.org.          This list is accurate as of 5/21/18  1:16 PM.  Always use your most recent med list.                   Brand Name Dispense Instructions for use Diagnosis    acetaminophen 500 MG Caps     30 capsule    Take 2 capsules by mouth every 8 hours as needed For aches, pain, fever        gabapentin 300 MG capsule    NEURONTIN    180 capsule    Take 2 capsules (600 mg) by mouth 3 times daily    Chronic right-sided low back pain with right-sided sciatica, Chronic pain syndrome, Muscle spasm       ibuprofen 600 MG tablet    ADVIL/MOTRIN    30 tablet    Take 1 tablet (600 mg) by mouth every 8 hours as needed for moderate pain        methocarbamol 500 MG tablet    ROBAXIN    60 tablet    Take 1-2 tablets (500-1,000 mg) by mouth 4 times daily as needed for muscle spasms    Muscle spasm       ondansetron 4 MG tablet    ZOFRAN    18 tablet    Take 1 tablet (4 mg) by mouth every 8 hours as needed for nausea

## 2018-05-21 NOTE — LETTER
[facility]  [address] [address] [address]  [phone]          May 21, 2018    Alexandru Trujillo                                                                                                                     [address] [place] [id number]          To Whom It May Concern:    The above named patient was seen in our clinic for an appointment today.        Sincerely,         Allison Qiu, PT

## 2018-05-21 NOTE — PROGRESS NOTES
PAIN PHYSICAL THERAPY PROGRESS NOTE  Patient Name: Alexandru Trujillo      YOB: 1985     Medical Record Number: [id number]  Diagnosis:    Chronic right-sided low back pain with right-sided sciatica  Chronic pain syndrome    Visit: 3/13    Subjective: Patient reports back pain flare since returning to work last week.  Noted sharp, shooting pain into buttocks and radiating to right leg.  Has difficulty with sit to stand and poor sleep due to pain.  Repeated bending and lifting movements increase back pain..    Self Care  HEP: begin SL shoulder/hip glides  Walking/Aerobic Activity: next  Posture: neutral spine with bed and chair mobility  Breathing/Relaxation: begin  Heat/Ice: next  Mini Breaks: next  Pacing: next      Objective Findings:  OBSERVATION: Pt is here with her .  Upright posture with slow and mild antalgic gait  RANGE OF MOTION: Trunk AROM is limited to 50% lateral bending bilaterally; extension WFL; flexion-not tested  PALPATION: Hypersensitivity with touch at T-L junction and noted mild accentuated extension; pain pattern includes guarding at T-L junction with impaired diaphragmatic breathing.  SPECIAL TESTS: crossed pattern increased right LBP with seated left LE slump sit test.  Last session:  Instructed patient in supported standing trunk SB with arms at side reaching to knees; reinforced breathing awareness and monitor elevated shoulders  Instructed in pillow support for neutral skeletal alignment in supine and side lying.  Pt noted significant tension/pain relief and unrestricted breathing pattern with supported postures.    Reviewed log rolling bed mobility with transitioning from supine to sitting.  Today instructed patient in side lying right shoulder/hip glides, global pattern, coordinated with breathing.  More relaxed overall after session.  Instructed in body mechanics sit to standing and side lying to sitting bed mobility.         Treatment Interventions:  Neuromuscular  Reeducation:   For 50 minutes as above  __________________________________________________________________  Instruction on home program: SB, SL shoulder/hip glides    Assessment:  Ongoing Functional Limitations Include:  Patient tolerated/responded well to treatment    Recommendations: follow up with pain psychology    Intensity Level: 4 (1=low intensity; 5=high intensity)  Demonstrates/Verbalizes Technique: 4 (1= poor technique-difficulty performing exercises,significant cues required; 5= good technique-performs exercises without cues)  Body Awareness: 3 (1=low awareness; 5=high awareness)  Posture/Stability: 3 (1= poor posture, stability; 5= good posture, stability)  Motivational Level: Ask questions, Eager to learn and Cooperative  Response to Teaching: cooperative  Factors that affect learning: None    _______________________________________________________________________  Plan of Care  Continue PT to support reactivation and integration of self regulation pain management skills;  Continue with prescribed plan of care - progress as tolerated.  Focus next session will be on: walking, mini breaks, pacing: progress SL shoulder/hip glides, progress HEP as able     Present:  EVERTON     Total Visit Time:  50 minutes    Therapist: Allison Qiu PT             Date: 5/21/2018

## 2018-05-21 NOTE — TELEPHONE ENCOUNTER
PA approval received for 13 physical therapy visits from 5/7/18 to 10/7/18. Authorization #[id number]      Marilee Resendiz    [facility]

## 2018-05-29 NOTE — PROGRESS NOTES
[facility]    Date of visit: 5/30/2018    Chief complaint:   Chief Complaint   Patient presents with     Pain       Interval history:  Alexandru Trujillo is a 32 year old female last seen by me on 5/2/18.      Recommendations/plan at the last visit included:                        1.  Pain Physical Therapy:  YES   Schedule first visit with OLIVIA Cooper.                         2.  Pain Psychologist to address relaxation, behavioral change, coping style, and other factors important to improvement.  YES  Schedule first visit with Dr. Kay or Edgar Mendenhall with [facility].                        3.  Medication Management:                                               1. Alexandru states Flexeril is not very helpful for muscle spasm, is very sedation. She is interested in Robaxin. Stop Flexeril. Prescription for Robaxin today, okay to take one to two tablets three times daily as needed for muscle spams. We discussed this should be less sedating.                                               2. We discussed gabapentin and amitriptyline as options to start next. She is interested in both but would like to start with gabapentin. Start gabapentin as directed below. Call with issues.  Gabapentin 1 tab= 300mg  AM                                                            PM                                                              Bedtime  0                                                                 0                                                                 300mg (1 tab).  After 3-5 days, increase as tolerated   300mg (1 tab)                                          0                                                                 300mg (1 tab).  After 3-5 days, increase as tolerated   300mg (1 tab)                                          300mg (1 tab)                                          300mg (1 tab).  After 3-5 days, increase as tolerated   300mg (1 tab)                     "                      300mg (1 tab)                                          600mg (2 tabs). After 3-5 days, increase as tolerated   600mg (2 tabs)                                        300mg (1 tab)                                          600mg (2 tabs). After 3-5 days, increase as tolerated   600mg (2 tabs)                                        600mg (2 tabs)                                        600mg (2 tabs).      Call with any problems.  Caution for sedation.    Do not drive until you know how the medication affects you.   You can go slower if you need to or increasing only one dose at a time.  Do not stop abruptly once at higher doses.  This medication must be tapered off.                        4.  Potential procedures: could discuss in the future.                          5.  Follow up with DEBORAH Ro CNP in 4 weeks.     Since her last visit, Alexandru KALI Trujillo reports:  -Her pain is better than it was at last visit,\"I think I'm better this time than I was the first time.\"  -The pain is located in low back, often radiates to right buttock.   -She started gabapentin as directed, is now taking 300mg BID. She was not able to increase to midday dose due to sleepiness while at work.   -She states she is tired of taking pills, would like to decrease the number.   -She states she has the most pain in the middle of the day while at work.   -She tried using the Robaxin instead of Flexeril, found that this was sedating but continues to take on occasion.   -She has had x3 sessions with OLIVIA Cooper, states this has been going well, \"I love it, I love working with her.\" She feels that she has been making progress.  -She practices good posture and stretching on a regular basis, finds benefit with this. Applies this to work as well.   -She notes she has been feeling depressed related to pain lately. She notes being limited at work has made this worse. She would be interested in medication to help with this. "     Pain Information:   Pain quality: Shooting and Unbearable    Pain timing: Constant     Pain rating: intensity ranges from 5/10 to 10/10, and averages 7/10 on a 0-10 scale.   Aggravating factors include: walking   Relieving factors include: laying down    Current pain treatments:    Gabapentin 300mg BID- H   Robaxin 500-1000mg- [facility], takes at night sometimes    Tylenol 500mg- [facility], 3-5 tabs/day   Ibuprofen 600mg- [facility], takes occasionally, hx of ulcer     Current MME: 0    Annual Controlled Substance Agreement/UDS due date: N/A    Past pain treatments:  1. Previous Pain Relevant Medications:  NOTE: This medication information taken from patient's intake form, not medical records.                         Opiates: T3- H, Vicodin- H                        NSAIDS: ibuprofen- [facility], Naproxen- [facility]                        Muscle Relaxants: Flexeril- [facility], SE, Robaxin- [facility]                        Anti-migraine mediations: no                        Anti-depressants: no                        Sleep aids: no                        Anxiolytics: no                        Neuropathics: no                                           Topicals: Dragon (stronger cream than Bengay)- H for couple hours, lidocaine patches- [facility] short term                        Other medications not covered above: Tylenol- [facility]     2. Physical Therapy: yes- [facility] April 18- too challenging/flared pain  3. Surgery: no  4. Injections: no  5. Chiropractic: no  6. Acupuncture: no  7. TENS Unit: no, did try something similar at [facility]    Medications:  Current Outpatient Prescriptions   Medication Sig Dispense Refill     acetaminophen 500 MG CAPS Take 2 capsules by mouth every 8 hours as needed For aches, pain, fever 30 capsule 0     DULoxetine (CYMBALTA) 30 MG EC capsule Take 2 capsules (60 mg) by mouth daily 60 capsule 1     gabapentin (NEURONTIN) 300 MG capsule Take 2 capsules (600 mg) by mouth 3 times daily 180 capsule 3     ibuprofen (ADVIL/MOTRIN) 600  "MG tablet Take 1 tablet (600 mg) by mouth every 8 hours as needed for moderate pain 30 tablet 0     ondansetron (ZOFRAN) 4 MG tablet Take 1 tablet (4 mg) by mouth every 8 hours as needed for nausea 18 tablet 0       Medical History: any changes in medical history since they were last seen? Yes- feeling depressed    Review of Systems:  The 14 system ROS was reviewed from the intake questionnaire, and is positive for: SOB, back pain, depression.   Any bowel or bladder problems: denies  Mood: \"depressed, but I'm more happier now\"     Physical Exam:  Blood pressure 104/70, pulse 58, weight 92.5 kg (204 lb), last menstrual period 10/26/2016, SpO2 99 %, not currently breastfeeding.  General: A&O x4, no signs of distress.  Gait: Antalgic.  MSK exam: 5/5 upper and lower extremity strength.    Imaging:  MRI of lumbar spine was completed on 4/6/18 and shows:  T12-L1:  No disc herniation or stenosis. Facet joints are  unremarkable.      L1-L2:  No disc herniation or stenosis. Facet joints are unremarkable.          L2-L3:  No disc herniation or stenosis. Facet joints are unremarkable.      L3-L4:  No disc herniation or stenosis. Facet joints are unremarkable.          L4-L5:  Mild facet degenerative changes. Mild degenerative disc  disease with mild disc bulge. No stenosis.      L5-S1:  Mild facet degenerative changes. Mild disc bulge. No central  stenosis. Mild left foraminal stenosis. Right neural foramen is  patent.      Paraspinous soft tissues:  Unremarkable.          IMPRESSION:    1. At L4-L5 there are mild facet degenerative changes and mild disc  bulge. No stenosis.  2. At L5-S1 there are mild facet degenerative changes and disc bulge  that lateralizes very slightly to the left. Mild left foraminal  stenosis without nerve root compression.       Assessment:   Alexandru Trujillo is a 32 year old female with a past medical history significant for dysmenorrhea and cholelithiasis who presents with complaints of low back pain " .      1. Low back pain- etiology unclear, likely mild facet degenerative changes L4-5 and L5-S1, mild degenerative disc disease with mild disc bulge L4-5, and mild disc bulge lateralizing slightly to left L5-S1.    2. Mental Health - the patient's mental health concerns, specifically situational depression, affect her experience of pain and contribute to her clinically significant distress.      1. Chronic right-sided low back pain with right-sided sciatica    2. Chronic pain syndrome        Plan:     1.  Pain Physical Therapy:  YES   Continue to see OLIVIA Cooper as recommended.    2.  Pain Psychologist to address relaxation, behavioral change, coping style, and other factors important to improvement.  YES  Schedule first visit with Dr. Tee.    3.  Medication Management:     1. We discussed an increase in gabapentin, though Alexandru hasn't tolerated TID medication, could increase BID doses. She is interested in this. First take x2 capsules at bedtime for 1 week. Thereafter, as tolerated increase to x2 capsules in the morning as well. She will call with issues.     2. Alexandru is interested in medication to help with mood. Discussed Cymbalta and Effexor, she is interested in Cymbalta. First start with x1 capsule in the evening for 1 week. Then increase to x2 capsules in the evening. Call with issues.    4.  Potential procedures: can discuss in the future.     5.  Alexandru was given a note stating she was seen in clinic today.    6.  Follow up with DEBORAH Ro CNP in 4-6 weeks.       I spent 30 minutes of time face to face with the patient.  Greater than 50% of this time was spent in patient counseling and/or coordination of care.    Kia CABRERA CNP  [facility]

## 2018-05-30 ENCOUNTER — OFFICE VISIT (OUTPATIENT)
Dept: PALLIATIVE MEDICINE | Facility: CLINIC | Age: 33
End: 2018-05-30
Payer: COMMERCIAL

## 2018-05-30 VITALS
HEART RATE: 58 BPM | OXYGEN SATURATION: 99 % | DIASTOLIC BLOOD PRESSURE: 70 MMHG | BODY MASS INDEX: 35.57 KG/M2 | SYSTOLIC BLOOD PRESSURE: 104 MMHG | WEIGHT: 204 LBS

## 2018-05-30 DIAGNOSIS — G89.29 CHRONIC RIGHT-SIDED LOW BACK PAIN WITH RIGHT-SIDED SCIATICA: Primary | ICD-10-CM

## 2018-05-30 DIAGNOSIS — M54.41 CHRONIC RIGHT-SIDED LOW BACK PAIN WITH RIGHT-SIDED SCIATICA: Primary | ICD-10-CM

## 2018-05-30 DIAGNOSIS — G89.4 CHRONIC PAIN SYNDROME: ICD-10-CM

## 2018-05-30 PROCEDURE — 99214 OFFICE O/P EST MOD 30 MIN: CPT | Performed by: NURSE PRACTITIONER

## 2018-05-30 RX ORDER — DULOXETIN HYDROCHLORIDE 30 MG/1
60 CAPSULE, DELAYED RELEASE ORAL DAILY
Qty: 60 CAPSULE | Refills: 1 | Status: SHIPPED | OUTPATIENT
Start: 2018-05-30 | End: 2018-05-31

## 2018-05-30 ASSESSMENT — PAIN SCALES - GENERAL: PAINLEVEL: MODERATE PAIN (5)

## 2018-05-30 NOTE — PATIENT INSTRUCTIONS
Diagnosis reviewed, treatment option addressed, and risk/benifits discussed.  Self-care instructions given.  I am recommending a multidisciplinary treatment plan to help this patient better manage her pain.       1.  Pain Physical Therapy:  YES   Continue to see OLIVIA Cooper as recommended.    2.  Pain Psychologist to address relaxation, behavioral change, coping style, and other factors important to improvement.  YES  Schedule first visit with Dr. Tee.    3.  Medication Management:     1. I would like you to try an increase in gabapentin. First take x2 capsules at bedtime for 1 week. Thereafter, as tolerated increase to x2 capsules in the morning as well.    2. We will start Cymbalta to help with pain and mood as well. First start with x1 capsule in the evening for 1 week. Then increase to x2 capsules in the evening. Call with issues.    4.  Potential procedures: can discuss in the future.     5.  Follow up with DEBORAH Ro CNP in 4-6 weeks.     Simi Tee, Ph.D., L.P.   Clinical Health Psychologist  [email or web address]  [address] [place] [id number]  [phone]    ----------------------------------------------------------------  Nurse Triage line:  [phone]   Call this number with any questions or concerns. You may leave a detailed message anytime. Calls are typically returned Monday through Friday between 8 AM and 4:30 PM. We usually get back to you within 2 business days depending on the issue/request.       Medication refills:    For non-narcotic medications, call your pharmacy directly to request a refill. The pharmacy will contact the [facility] Center for authorization. Please allow 3-4 days for these refills to be processed.     For narcotic refills, call the nurse triage line or send a [employer] message. Please contact us 7-10 days before your refill is due. The message MUST include the name of the specific medication(s) requested and how you would like to receive the  prescription(s). The options are as follows:    Pain Clinic staff can mail the prescription to your pharmacy. Please tell us the name of the pharmacy.    You may pick the prescription up at the Pain Clinic (tell us the location) or during a clinic visit with your pain provider    Pain Clinic staff can deliver the prescription to the [facility] pharmacy in the clinic building. Please tell us the location.      Scheduling number: [phone].  Call this number to schedule or change appointments.    We believe regular attendance is key to your success in our program.    Any time you are unable to keep your appointment we ask that you call us at least 24 hours in advance to let us know. This will allow us to offer the appointment time to another patient.

## 2018-05-30 NOTE — MR AVS SNAPSHOT
After Visit Summary   5/30/2018    Alexandru Trujillo    MRN: [id number]           Patient Information     Date Of Birth          1985        Visit Information        Provider Department      5/30/2018 10:30 AM Mayte Jiang APRN CNP [facility]        Today's Diagnoses     Chronic right-sided low back pain with right-sided sciatica    -  1    Chronic pain syndrome          Care Instructions    Diagnosis reviewed, treatment option addressed, and risk/benifits discussed.  Self-care instructions given.  I am recommending a multidisciplinary treatment plan to help this patient better manage her pain.       1.  Pain Physical Therapy:  YES   Continue to see OLIVIA Cooper as recommended.    2.  Pain Psychologist to address relaxation, behavioral change, coping style, and other factors important to improvement.  YES  Schedule first visit with Dr. Tee.    3.  Medication Management:     1. I would like you to try an increase in gabapentin. First take x2 capsules at bedtime for 1 week. Thereafter, as tolerated increase to x2 capsules in the morning as well.    2. We will start Cymbalta to help with pain and mood as well. First start with x1 capsule in the evening for 1 week. Then increase to x2 capsules in the evening. Call with issues.    4.  Potential procedures: can discuss in the future.     5.  Follow up with DEBORAH Ro CNP in 4-6 weeks.     Simi Tee, Ph.D., L.P.   Clinical Health Psychologist  [email or web address]  [address] [place] [address]  [phone]    ----------------------------------------------------------------  Nurse Triage line:  [phone]   Call this number with any questions or concerns. You may leave a detailed message anytime. Calls are typically returned Monday through Friday between 8 AM and 4:30 PM. We usually get back to you within 2 business days depending on the issue/request.       Medication refills:    For non-narcotic medications, call  your pharmacy directly to request a refill. The pharmacy will contact the [facility] for authorization. Please allow 3-4 days for these refills to be processed.     For narcotic refills, call the nurse triage line or send a [facility] message. Please contact us 7-10 days before your refill is due. The message MUST include the name of the specific medication(s) requested and how you would like to receive the prescription(s). The options are as follows:    Pain Clinic staff can mail the prescription to your pharmacy. Please tell us the name of the pharmacy.    You may pick the prescription up at the Pain Clinic (tell us the location) or during a clinic visit with your pain provider    Pain Clinic staff can deliver the prescription to the [facility] pharmacy in the clinic building. Please tell us the location.      Scheduling number: [phone].  Call this number to schedule or change appointments.    We believe regular attendance is key to your success in our program.    Any time you are unable to keep your appointment we ask that you call us at least 24 hours in advance to let us know. This will allow us to offer the appointment time to another patient.               Follow-ups after your visit        Who to contact     If you have questions or need follow up information about today's clinic visit or your schedule please contact [facility] directly at [phone].  Normal or non-critical lab and imaging results will be communicated to you by MyChart, letter or phone within 4 business days after the clinic has received the results. If you do not hear from us within 7 days, please contact the clinic through MyChart or phone. If you have a critical or abnormal lab result, we will notify you by phone as soon as possible.  Submit refill requests through [facility] or call your pharmacy and they will forward the refill request to us. Please allow 3 business days for your refill to be completed.        "   Additional Information About Your Visit        MyChart Information     [employer] lets you send messages to your doctor, view your test results, renew your prescriptions, schedule appointments and more. To sign up, go to [email or web address] . Click on \"Log in\" on the left side of the screen, which will take you to the Welcome page. Then click on \"Sign up Now\" on the right side of the page.     You will be asked to enter the access code listed below, as well as some personal information. Please follow the directions to create your username and password.     Your access code is: [email or web address]  Expires: 2018 10:58 AM     Your access code will  in 90 days. If you need help or a new code, please call your [facility] clinic or [phone].        Care EveryWhere ID     This is your Care EveryWhere ID. This could be used by other organizations to access your [facility] medical records  [id number]        Your Vitals Were     Pulse Last Period Pulse Oximetry BMI (Body Mass Index)          58 10/26/2016 99% 35.57 kg/m2         Blood Pressure from Last 3 Encounters:   18 104/70   18 108/72   18 100/60    Weight from Last 3 Encounters:   18 92.5 kg (204 lb)   18 93 kg (205 lb)   18 93.3 kg (205 lb 9.6 oz)              Today, you had the following     No orders found for display         Today's Medication Changes          These changes are accurate as of 18 10:58 AM.  If you have any questions, ask your nurse or doctor.               Start taking these medicines.        Dose/Directions    DULoxetine 30 MG EC capsule   Commonly known as:  CYMBALTA   Used for:  Chronic right-sided low back pain with right-sided sciatica, Chronic pain syndrome   Started by:  Mayte Jiang APRN CNP        Dose:  60 mg   Take 2 capsules (60 mg) by mouth daily   Quantity:  60 capsule   Refills:  1         Stop taking these medicines if you haven't already. Please contact your care team if " you have questions.     methocarbamol 500 MG tablet   Commonly known as:  ROBAXIN   Stopped by:  Mayte Jiang, DEBORAH VILLALTA                Where to get your medicines      These medications were sent to [facility] - [facility] [address] AT [facility] [address], [facility] [address]     Phone:  [phone]     DULoxetine 30 MG EC capsule                Primary Care Provider Office Phone # Fax #    Eveline Olivares [phone]       [address]  [facility] [address]        Equal Access to Services     LEE [place] : Hadii aad ku hadasho Soomaali, waaxda luqadaha, qaybta kaalmada adeegyada, waxay idiin hayhin desean nayak . So [facility] [phone].    ATENCIÓN: Si habla español, tiene a sears disposición servicios gratuitos de asistencia lingüística. [facility] [phone].    We comply with applicable federal civil rights laws and [place] laws. We do not discriminate on the basis of race, color, national origin, age, disability, sex, sexual orientation, or gender identity.            Thank you!     Thank you for choosing [facility]  for your care. Our goal is always to provide you with excellent care. Hearing back from our patients is one way we can continue to improve our services. Please take a few minutes to complete the written survey that you may receive in the mail after your visit with us. Thank you!             Your Updated Medication List - Protect others around you: Learn how to safely use, store and throw away your medicines at www.disposemymeds.org.          This list is accurate as of 5/30/18 10:58 AM.  Always use your most recent med list.                   Brand Name Dispense Instructions for use Diagnosis    acetaminophen 500 MG Caps     30 capsule    Take 2 capsules by mouth every 8 hours as needed For aches, pain, fever        DULoxetine 30 MG EC capsule    CYMBALTA    60 capsule    Take 2 capsules (60  mg) by mouth daily    Chronic right-sided low back pain with right-sided sciatica, Chronic pain syndrome       gabapentin 300 MG capsule    NEURONTIN    180 capsule    Take 2 capsules (600 mg) by mouth 3 times daily    Chronic right-sided low back pain with right-sided sciatica, Chronic pain syndrome, Muscle spasm       ibuprofen 600 MG tablet    ADVIL/MOTRIN    30 tablet    Take 1 tablet (600 mg) by mouth every 8 hours as needed for moderate pain        ondansetron 4 MG tablet    ZOFRAN    18 tablet    Take 1 tablet (4 mg) by mouth every 8 hours as needed for nausea

## 2018-05-30 NOTE — LETTER
[facility]  [address] [address] [address]  [phone]          May 30, 2018    Alexandru Trujillo                                                                                                                     [id number]  [place] [id number]            To whomever it may concern:    Alexandru was see at our pain management clinic today.         Sincerely,         Mayte Jiang CNP

## 2018-05-31 DIAGNOSIS — G89.29 CHRONIC RIGHT-SIDED LOW BACK PAIN WITH RIGHT-SIDED SCIATICA: ICD-10-CM

## 2018-05-31 DIAGNOSIS — G89.4 CHRONIC PAIN SYNDROME: ICD-10-CM

## 2018-05-31 DIAGNOSIS — M54.41 CHRONIC RIGHT-SIDED LOW BACK PAIN WITH RIGHT-SIDED SCIATICA: ICD-10-CM

## 2018-05-31 RX ORDER — DULOXETIN HYDROCHLORIDE 60 MG/1
60 CAPSULE, DELAYED RELEASE ORAL DAILY
Qty: 30 CAPSULE | Refills: 1 | Status: SHIPPED | OUTPATIENT
Start: 2018-05-31

## 2018-05-31 RX ORDER — DULOXETIN HYDROCHLORIDE 30 MG/1
30 CAPSULE, DELAYED RELEASE ORAL DAILY
Qty: 10 CAPSULE | Refills: 3 | Status: SHIPPED | OUTPATIENT
Start: 2018-05-31

## 2018-05-31 NOTE — TELEPHONE ENCOUNTER
New script prepped for cymbalta 60mg tabs. Routed to provider to review. Please advise.    Zaria KAUFFMAN-RN Care Coordinator  [facility]

## 2018-05-31 NOTE — TELEPHONE ENCOUNTER
Received medication question from [facility]'s pharmacy about the DULoxetine (CYMBALTA) 30 MG EC capsule refill. Pharmacist stated patient's insurance does not cover Si tablets per day for the, but  DULoxetine (CYMBALTA) 60 MG EC capsule si tablet per day is cover under patient's insurance. pharmacist is requesting change of medication dose or process PA.     Will route to nurse pool.       Armen Yang MA  [facility] Center

## 2018-05-31 NOTE — TELEPHONE ENCOUNTER
Signed Prescriptions:                        Disp   Refills    DULoxetine (CYMBALTA) 60 MG EC capsule     30 cap*1        Sig: Take 1 capsule (60 mg) by mouth daily  Authorizing Provider: KATHERINE MERRITT    DULoxetine (CYMBALTA) 30 MG EC capsule     10 cap*3        Sig: Take 1 capsule (30 mg) by mouth daily For 7-10 days.           Then take 60mg Cymbalta capsules.  Authorizing Provider: KATHERINE MERRITT    Prescription signed for 30mg and 60mg capsules. Please call Alexandru to advise her to take 30mg capsules for 7-10 days. Thereafter, take the 60mg capsules.     DEBORAH Ro CNP  [facility]

## 2018-06-08 ENCOUNTER — OFFICE VISIT (OUTPATIENT)
Dept: PALLIATIVE MEDICINE | Facility: CLINIC | Age: 33
End: 2018-06-08
Payer: COMMERCIAL

## 2018-06-08 DIAGNOSIS — M54.41 CHRONIC RIGHT-SIDED LOW BACK PAIN WITH RIGHT-SIDED SCIATICA: Primary | ICD-10-CM

## 2018-06-08 DIAGNOSIS — G89.29 CHRONIC RIGHT-SIDED LOW BACK PAIN WITH RIGHT-SIDED SCIATICA: Primary | ICD-10-CM

## 2018-06-08 DIAGNOSIS — G89.4 CHRONIC PAIN SYNDROME: ICD-10-CM

## 2018-06-08 PROCEDURE — 97530 THERAPEUTIC ACTIVITIES: CPT | Mod: GP | Performed by: PHYSICAL THERAPIST

## 2018-06-08 NOTE — PROGRESS NOTES
PAIN PHYSICAL THERAPY PROGRESS NOTE  Patient Name: Alexandru Trujillo      YOB: 1985     Medical Record Number: [id number]  Diagnosis:    Chronic right-sided low back pain with right-sided sciatica  Chronic pain syndrome    Visit: 4/13    Subjective: Patient reports she has resumed working 8 hours/day.  Feels frustrated that she needs to ask for help with bending/lifting heavy items.  Does not like to be dependent on other people.  Overall feeling better with walking and chair mobility.  Uses mini breaks at work to help decrease pain and muscle tightness.    Self Care  HEP: SL shoulder/hip glides  Walking/Aerobic Activity: next  Posture: neutral spine with chair and bed mobilty  Breathing/Relaxation: CD  Heat/Ice: next  Mini Breaks: next  Pacing: next      Objective Findings:  OBSERVATION: upright posture with mild shoulder elevation  PALPATION: hypersensitivity with palpation T-L junction and lumbar psp  SPECIAL TESTS: reduced crossed patter right LBP with seated left LE slump sit test  Patient was educated about nerve sensitivity caused by central sensitization and driven by both biological and psychosocial factors and emotions.  Discussed Brain's Pain Map. The patient was encouraged to identify personal stressors which contribute to pain and to discuss these with PT for guidance and plan to move ahead. Patient was educated on various chemicals released by the brain in response to stress, exercise and food choices, and how these chemicals contribute to increased or decreased pain levels. Patient was educated about the function of the sympathetic nervous system and how it is impacted by persistent input/pain, as well as the importance of self-care techniques useful in calming the sympathetic nervous system.    Treatment Interventions:  Therapeutic Activity:   For 45 minutes as above; review self cares, breathing and body  mechanics  __________________________________________________________________  Instruction on home program: next    Assessment:  Ongoing Functional Limitations Include:  Patient tolerated/responded well to treatment    Intensity Level: 3 (1=low intensity; 5=high intensity)  Demonstrates/Verbalizes Technique: 4 (1= poor technique-difficulty performing exercises,significant cues required; 5= good technique-performs exercises without cues)  Body Awareness: 4 (1=low awareness; 5=high awareness)  Posture/Stability: 4 (1= poor posture, stability; 5= good posture, stability)  Motivational Level: Ask questions, Eager to learn and Cooperative  Response to Teaching: cooperative  Factors that affect learning: None    _______________________________________________________________________  Plan of Care  Continue PT to support reactivation and integration of self regulation pain management skills;  Continue with prescribed plan of care - progress as tolerated.  Focus next session will be on: walking, mini breaks, pacing; progress HEP including maria elena arreola as able     Present:  EVERTON     Total Visit Time:  45 minutes    Therapist: Allison Qiu PT             Date: 6/8/2018

## 2018-06-08 NOTE — LETTER
[facility]  [address] [address] [address]  [phone]          June 8, 2018    Alexandru Trujillo                                                                                                                     [address]  [facility] [address]          Patient was seen in clinic for an appointment today        Sincerely,         Allison Qiu,  PT

## 2018-06-08 NOTE — MR AVS SNAPSHOT
"              After Visit Summary   6/8/2018    Alexandru Trujillo    MRN: [id number]           Patient Information     Date Of Birth          1985        Visit Information        Provider Department      6/8/2018 9:30 AM Allison Qiu, OLIVIA [facility]        Today's Diagnoses     Chronic right-sided low back pain with right-sided sciatica    -  1    Chronic pain syndrome           Follow-ups after your visit        Your next 10 appointments already scheduled     Stephen 15, 2018  1:00 PM CDT   Return Visit with Allison Qiu PT   [facility] ([facility])    [address] [facility]  [address] [place] [address]   [phone]            Jun 27, 2018  9:00 AM CDT   Return Visit with DEBORAH Barrow CNP   [facility] ([facility])    [address] [address] [address]   [phone]              Who to contact     If you have questions or need follow up information about today's clinic visit or your schedule please contact [facility] directly at [phone].  Normal or non-critical lab and imaging results will be communicated to you by [employer], letter or phone within 4 business days after the clinic has received the results. If you do not hear from us within 7 days, please contact the clinic through [employer] or phone. If you have a critical or abnormal lab result, we will notify you by phone as soon as possible.  Submit refill requests through [employer] or call your pharmacy and they will forward the refill request to us. Please allow 3 business days for your refill to be completed.          Additional Information About Your Visit        MyChart Information     [employer] lets you send messages to your doctor, view your test results, renew your prescriptions, schedule appointments and more. To sign up, go to [email or web address] . Click on \"Log in\" on the left side of the screen, " "which will take you to the Welcome page. Then click on \"Sign up Now\" on the right side of the page.     You will be asked to enter the access code listed below, as well as some personal information. Please follow the directions to create your username and password.     Your access code is: [email or web address]  Expires: 2018 10:58 AM     Your access code will  in 90 days. If you need help or a new code, please call your [facility] clinic or [phone].        Care EveryWhere ID     This is your Care EveryWhere ID. This could be used by other organizations to access your [facility] medical records  [id number]        Your Vitals Were     Last Period                   10/26/2016            Blood Pressure from Last 3 Encounters:   18 104/70   18 108/72   18 100/60    Weight from Last 3 Encounters:   18 92.5 kg (204 lb)   18 93 kg (205 lb)   18 93.3 kg (205 lb 9.6 oz)              We Performed the Following     THERAPEUTIC ACTIVITIES        Primary Care Provider Office Phone # Fax #    Eveline Olivares [phone]       [address] [facility] [address]        Equal Access to Services     TD [place] : Hadii aad ku hadasho Soomaali, waaxda luqadaha, qaybta kaalmada adeegyada, waxay idiin hayaan desean nayak . So [facility] [phone].    ATENCIÓN: Si habla español, tiene a sears disposición servicios gratuitos de asistencia lingüística. Llame al [phone].    We comply with applicable federal civil rights laws and [place] laws. We do not discriminate on the basis of race, color, national origin, age, disability, sex, sexual orientation, or gender identity.            Thank you!     Thank you for choosing [facility]  for your care. Our goal is always to provide you with excellent care. Hearing back from our patients is one way we can continue to improve our services. Please take a few minutes to complete the written survey that you may " receive in the mail after your visit with us. Thank you!             Your Updated Medication List - Protect others around you: Learn how to safely use, store and throw away your medicines at www.disposemymeds.org.          This list is accurate as of 6/8/18 11:48 AM.  Always use your most recent med list.                   Brand Name Dispense Instructions for use Diagnosis    acetaminophen 500 MG Caps     30 capsule    Take 2 capsules by mouth every 8 hours as needed For aches, pain, fever        * DULoxetine 60 MG EC capsule    CYMBALTA    30 capsule    Take 1 capsule (60 mg) by mouth daily    Chronic right-sided low back pain with right-sided sciatica, Chronic pain syndrome       * DULoxetine 30 MG EC capsule    CYMBALTA    10 capsule    Take 1 capsule (30 mg) by mouth daily For 7-10 days. Then take 60mg Cymbalta capsules.    Chronic right-sided low back pain with right-sided sciatica, Chronic pain syndrome       gabapentin 300 MG capsule    NEURONTIN    180 capsule    Take 2 capsules (600 mg) by mouth 3 times daily    Chronic right-sided low back pain with right-sided sciatica, Chronic pain syndrome, Muscle spasm       ibuprofen 600 MG tablet    ADVIL/MOTRIN    30 tablet    Take 1 tablet (600 mg) by mouth every 8 hours as needed for moderate pain        ondansetron 4 MG tablet    ZOFRAN    18 tablet    Take 1 tablet (4 mg) by mouth every 8 hours as needed for nausea        * Notice:  This list has 2 medication(s) that are the same as other medications prescribed for you. Read the directions carefully, and ask your doctor or other care provider to review them with you.

## 2020-05-27 NOTE — DISCHARGE INSTRUCTIONS
Please avoid Ibuprofen and other NSAID. Tylenol is a better choice for pain. The [facility] referral service will call you in the next couple of days to set up an appointment. Return to the ED if blood in the stool or worsening symptoms.   
decreased strength/impaired balance/cognition